# Patient Record
Sex: FEMALE | Race: WHITE | NOT HISPANIC OR LATINO | Employment: FULL TIME | URBAN - METROPOLITAN AREA
[De-identification: names, ages, dates, MRNs, and addresses within clinical notes are randomized per-mention and may not be internally consistent; named-entity substitution may affect disease eponyms.]

---

## 2017-01-13 ENCOUNTER — ALLSCRIPTS OFFICE VISIT (OUTPATIENT)
Dept: OTHER | Facility: OTHER | Age: 37
End: 2017-01-13

## 2017-01-17 RX ORDER — TURMERIC ROOT EXTRACT 500 MG
TABLET ORAL
COMMUNITY

## 2017-01-17 RX ORDER — LORAZEPAM 0.5 MG/1
TABLET ORAL
COMMUNITY
Start: 2013-12-02

## 2017-01-17 RX ORDER — ACETAMINOPHEN 500 MG
500 TABLET ORAL EVERY 6 HOURS PRN
COMMUNITY
End: 2022-04-21 | Stop reason: ALTCHOICE

## 2017-01-17 RX ORDER — IBUPROFEN 600 MG/1
TABLET ORAL
COMMUNITY
Start: 2012-08-22 | End: 2017-01-25 | Stop reason: HOSPADM

## 2017-01-17 RX ORDER — ZINC GLUCONATE 50 MG
TABLET ORAL
COMMUNITY

## 2017-01-19 ENCOUNTER — TRANSCRIBE ORDERS (OUTPATIENT)
Dept: ADMINISTRATIVE | Facility: HOSPITAL | Age: 37
End: 2017-01-19

## 2017-01-19 ENCOUNTER — APPOINTMENT (OUTPATIENT)
Dept: LAB | Facility: HOSPITAL | Age: 37
End: 2017-01-19
Attending: SURGERY
Payer: COMMERCIAL

## 2017-01-19 DIAGNOSIS — K80.00 CALCULUS OF GALLBLADDER WITH ACUTE CHOLECYSTITIS WITHOUT OBSTRUCTION: Primary | ICD-10-CM

## 2017-01-19 DIAGNOSIS — K80.00 CALCULUS OF GALLBLADDER WITH ACUTE CHOLECYSTITIS WITHOUT OBSTRUCTION: ICD-10-CM

## 2017-01-19 LAB
ALBUMIN SERPL BCP-MCNC: 3.9 G/DL (ref 3.5–5)
ALP SERPL-CCNC: 69 U/L (ref 46–116)
ALT SERPL W P-5'-P-CCNC: 28 U/L (ref 12–78)
ANION GAP SERPL CALCULATED.3IONS-SCNC: 7 MMOL/L (ref 4–13)
AST SERPL W P-5'-P-CCNC: 15 U/L (ref 5–45)
BILIRUB DIRECT SERPL-MCNC: 0.25 MG/DL (ref 0–0.2)
BILIRUB SERPL-MCNC: 1.3 MG/DL (ref 0.2–1)
BUN SERPL-MCNC: 14 MG/DL (ref 5–25)
CALCIUM SERPL-MCNC: 8.9 MG/DL (ref 8.3–10.1)
CHLORIDE SERPL-SCNC: 105 MMOL/L (ref 100–108)
CO2 SERPL-SCNC: 29 MMOL/L (ref 21–32)
CREAT SERPL-MCNC: 0.71 MG/DL (ref 0.6–1.3)
GFR SERPL CREATININE-BSD FRML MDRD: >60 ML/MIN/1.73SQ M
GLUCOSE SERPL-MCNC: 127 MG/DL (ref 65–140)
POTASSIUM SERPL-SCNC: 3.8 MMOL/L (ref 3.5–5.3)
PROT SERPL-MCNC: 7.5 G/DL (ref 6.4–8.2)
SODIUM SERPL-SCNC: 141 MMOL/L (ref 136–145)

## 2017-01-19 PROCEDURE — 80053 COMPREHEN METABOLIC PANEL: CPT

## 2017-01-19 PROCEDURE — 82248 BILIRUBIN DIRECT: CPT

## 2017-01-19 PROCEDURE — 36415 COLL VENOUS BLD VENIPUNCTURE: CPT

## 2017-01-24 ENCOUNTER — ANESTHESIA EVENT (OUTPATIENT)
Dept: PERIOP | Facility: HOSPITAL | Age: 37
End: 2017-01-24
Payer: COMMERCIAL

## 2017-01-25 ENCOUNTER — HOSPITAL ENCOUNTER (OUTPATIENT)
Facility: HOSPITAL | Age: 37
Setting detail: OUTPATIENT SURGERY
Discharge: HOME | End: 2017-01-25
Attending: SURGERY | Admitting: SURGERY
Payer: COMMERCIAL

## 2017-01-25 ENCOUNTER — APPOINTMENT (OUTPATIENT)
Dept: RADIOLOGY | Facility: HOSPITAL | Age: 37
End: 2017-01-25
Payer: COMMERCIAL

## 2017-01-25 ENCOUNTER — ANESTHESIA (OUTPATIENT)
Dept: PERIOP | Facility: HOSPITAL | Age: 37
End: 2017-01-25
Payer: COMMERCIAL

## 2017-01-25 VITALS
OXYGEN SATURATION: 100 % | WEIGHT: 130 LBS | SYSTOLIC BLOOD PRESSURE: 101 MMHG | TEMPERATURE: 98.1 F | HEART RATE: 78 BPM | BODY MASS INDEX: 19.7 KG/M2 | HEIGHT: 68 IN | RESPIRATION RATE: 20 BRPM | DIASTOLIC BLOOD PRESSURE: 55 MMHG

## 2017-01-25 DIAGNOSIS — K80.20 CALCULUS OF GALLBLADDER WITHOUT CHOLECYSTITIS WITHOUT OBSTRUCTION: ICD-10-CM

## 2017-01-25 LAB
ALBUMIN SERPL BCP-MCNC: 3.7 G/DL (ref 3.5–5)
ALP SERPL-CCNC: 60 U/L (ref 46–116)
ALT SERPL W P-5'-P-CCNC: 22 U/L (ref 12–78)
ANION GAP SERPL CALCULATED.3IONS-SCNC: 10 MMOL/L (ref 4–13)
AST SERPL W P-5'-P-CCNC: 15 U/L (ref 5–45)
BILIRUB DIRECT SERPL-MCNC: 0.26 MG/DL (ref 0–0.2)
BILIRUB SERPL-MCNC: 1.4 MG/DL (ref 0.2–1)
BUN SERPL-MCNC: 14 MG/DL (ref 5–25)
CALCIUM SERPL-MCNC: 8.5 MG/DL (ref 8.3–10.1)
CHLORIDE SERPL-SCNC: 108 MMOL/L (ref 100–108)
CO2 SERPL-SCNC: 27 MMOL/L (ref 21–32)
CREAT SERPL-MCNC: 0.79 MG/DL (ref 0.6–1.3)
EXT PREGNANCY TEST URINE: NEGATIVE
GFR SERPL CREATININE-BSD FRML MDRD: >60 ML/MIN/1.73SQ M
GLUCOSE SERPL-MCNC: 84 MG/DL (ref 65–140)
POTASSIUM SERPL-SCNC: 4 MMOL/L (ref 3.5–5.3)
PROT SERPL-MCNC: 7.2 G/DL (ref 6.4–8.2)
SODIUM SERPL-SCNC: 145 MMOL/L (ref 136–145)

## 2017-01-25 PROCEDURE — 81025 URINE PREGNANCY TEST: CPT | Performed by: PHYSICIAN ASSISTANT

## 2017-01-25 PROCEDURE — 74300 X-RAY BILE DUCTS/PANCREAS: CPT

## 2017-01-25 PROCEDURE — 82248 BILIRUBIN DIRECT: CPT | Performed by: PHYSICIAN ASSISTANT

## 2017-01-25 PROCEDURE — 88304 TISSUE EXAM BY PATHOLOGIST: CPT | Performed by: SURGERY

## 2017-01-25 PROCEDURE — 80053 COMPREHEN METABOLIC PANEL: CPT | Performed by: PHYSICIAN ASSISTANT

## 2017-01-25 RX ORDER — PROPOFOL 10 MG/ML
INJECTION, EMULSION INTRAVENOUS AS NEEDED
Status: DISCONTINUED | OUTPATIENT
Start: 2017-01-25 | End: 2017-01-25 | Stop reason: SURG

## 2017-01-25 RX ORDER — ONDANSETRON 2 MG/ML
INJECTION INTRAMUSCULAR; INTRAVENOUS AS NEEDED
Status: DISCONTINUED | OUTPATIENT
Start: 2017-01-25 | End: 2017-01-25 | Stop reason: SURG

## 2017-01-25 RX ORDER — FENTANYL CITRATE/PF 50 MCG/ML
25 SYRINGE (ML) INJECTION
Status: COMPLETED | OUTPATIENT
Start: 2017-01-25 | End: 2017-01-25

## 2017-01-25 RX ORDER — KETOROLAC TROMETHAMINE 30 MG/ML
INJECTION, SOLUTION INTRAMUSCULAR; INTRAVENOUS AS NEEDED
Status: DISCONTINUED | OUTPATIENT
Start: 2017-01-25 | End: 2017-01-25 | Stop reason: SURG

## 2017-01-25 RX ORDER — EPHEDRINE SULFATE 50 MG/ML
INJECTION, SOLUTION INTRAVENOUS AS NEEDED
Status: DISCONTINUED | OUTPATIENT
Start: 2017-01-25 | End: 2017-01-25 | Stop reason: SURG

## 2017-01-25 RX ORDER — HYDROCODONE BITARTRATE AND ACETAMINOPHEN 5; 325 MG/1; MG/1
2 TABLET ORAL EVERY 4 HOURS PRN
Status: DISCONTINUED | OUTPATIENT
Start: 2017-01-25 | End: 2017-01-25 | Stop reason: HOSPADM

## 2017-01-25 RX ORDER — SODIUM CHLORIDE, SODIUM LACTATE, POTASSIUM CHLORIDE, CALCIUM CHLORIDE 600; 310; 30; 20 MG/100ML; MG/100ML; MG/100ML; MG/100ML
125 INJECTION, SOLUTION INTRAVENOUS CONTINUOUS
Status: DISCONTINUED | OUTPATIENT
Start: 2017-01-25 | End: 2017-01-25 | Stop reason: HOSPADM

## 2017-01-25 RX ORDER — SCOLOPAMINE TRANSDERMAL SYSTEM 1 MG/1
1 PATCH, EXTENDED RELEASE TRANSDERMAL
Status: DISCONTINUED | OUTPATIENT
Start: 2017-01-25 | End: 2017-01-25 | Stop reason: HOSPADM

## 2017-01-25 RX ORDER — METOCLOPRAMIDE HYDROCHLORIDE 5 MG/ML
10 INJECTION INTRAMUSCULAR; INTRAVENOUS ONCE
Status: COMPLETED | OUTPATIENT
Start: 2017-01-25 | End: 2017-01-25

## 2017-01-25 RX ORDER — ROCURONIUM BROMIDE 10 MG/ML
INJECTION, SOLUTION INTRAVENOUS AS NEEDED
Status: DISCONTINUED | OUTPATIENT
Start: 2017-01-25 | End: 2017-01-25 | Stop reason: SURG

## 2017-01-25 RX ORDER — MAGNESIUM HYDROXIDE 1200 MG/15ML
LIQUID ORAL AS NEEDED
Status: DISCONTINUED | OUTPATIENT
Start: 2017-01-25 | End: 2017-01-25 | Stop reason: HOSPADM

## 2017-01-25 RX ORDER — HYDROCODONE BITARTRATE AND ACETAMINOPHEN 5; 325 MG/1; MG/1
1-2 TABLET ORAL EVERY 6 HOURS PRN
Qty: 25 TABLET | Refills: 0 | Status: SHIPPED | OUTPATIENT
Start: 2017-01-25 | End: 2017-02-04

## 2017-01-25 RX ORDER — FENTANYL CITRATE 50 UG/ML
INJECTION, SOLUTION INTRAMUSCULAR; INTRAVENOUS AS NEEDED
Status: DISCONTINUED | OUTPATIENT
Start: 2017-01-25 | End: 2017-01-25 | Stop reason: SURG

## 2017-01-25 RX ORDER — MIDAZOLAM HYDROCHLORIDE 1 MG/ML
INJECTION INTRAMUSCULAR; INTRAVENOUS AS NEEDED
Status: DISCONTINUED | OUTPATIENT
Start: 2017-01-25 | End: 2017-01-25 | Stop reason: SURG

## 2017-01-25 RX ORDER — ONDANSETRON 2 MG/ML
4 INJECTION INTRAMUSCULAR; INTRAVENOUS EVERY 6 HOURS PRN
Status: DISCONTINUED | OUTPATIENT
Start: 2017-01-25 | End: 2017-01-25 | Stop reason: HOSPADM

## 2017-01-25 RX ORDER — GLYCOPYRROLATE 0.2 MG/ML
INJECTION INTRAMUSCULAR; INTRAVENOUS AS NEEDED
Status: DISCONTINUED | OUTPATIENT
Start: 2017-01-25 | End: 2017-01-25 | Stop reason: SURG

## 2017-01-25 RX ADMIN — FENTANYL CITRATE 25 MCG: 50 INJECTION, SOLUTION INTRAMUSCULAR; INTRAVENOUS at 08:55

## 2017-01-25 RX ADMIN — FENTANYL CITRATE 25 MCG: 50 INJECTION, SOLUTION INTRAMUSCULAR; INTRAVENOUS at 09:25

## 2017-01-25 RX ADMIN — GLYCOPYRROLATE 0.1 MG: 0.2 INJECTION, SOLUTION INTRAMUSCULAR; INTRAVENOUS at 08:24

## 2017-01-25 RX ADMIN — HYDROMORPHONE HYDROCHLORIDE 0.25 MG: 1 INJECTION, SOLUTION INTRAMUSCULAR; INTRAVENOUS; SUBCUTANEOUS at 09:39

## 2017-01-25 RX ADMIN — SODIUM CHLORIDE, SODIUM LACTATE, POTASSIUM CHLORIDE, AND CALCIUM CHLORIDE: .6; .31; .03; .02 INJECTION, SOLUTION INTRAVENOUS at 07:28

## 2017-01-25 RX ADMIN — KETOROLAC TROMETHAMINE 15 MG: 30 INJECTION, SOLUTION INTRAMUSCULAR at 08:20

## 2017-01-25 RX ADMIN — FENTANYL CITRATE 25 MCG: 50 INJECTION, SOLUTION INTRAMUSCULAR; INTRAVENOUS at 07:55

## 2017-01-25 RX ADMIN — FENTANYL CITRATE 25 MCG: 50 INJECTION, SOLUTION INTRAMUSCULAR; INTRAVENOUS at 09:10

## 2017-01-25 RX ADMIN — ROCURONIUM BROMIDE 40 MG: 10 INJECTION, SOLUTION INTRAVENOUS at 07:45

## 2017-01-25 RX ADMIN — GLYCOPYRROLATE 0.1 MG: 0.2 INJECTION, SOLUTION INTRAMUSCULAR; INTRAVENOUS at 08:25

## 2017-01-25 RX ADMIN — SCOPALAMINE 1 PATCH: 1 PATCH, EXTENDED RELEASE TRANSDERMAL at 11:16

## 2017-01-25 RX ADMIN — FENTANYL CITRATE 25 MCG: 50 INJECTION, SOLUTION INTRAMUSCULAR; INTRAVENOUS at 09:00

## 2017-01-25 RX ADMIN — PROPOFOL 150 MG: 10 INJECTION, EMULSION INTRAVENOUS at 07:45

## 2017-01-25 RX ADMIN — METRONIDAZOLE 500 MG: 500 INJECTION, SOLUTION INTRAVENOUS at 07:50

## 2017-01-25 RX ADMIN — HYDROMORPHONE HYDROCHLORIDE 0.25 MG: 1 INJECTION, SOLUTION INTRAMUSCULAR; INTRAVENOUS; SUBCUTANEOUS at 09:56

## 2017-01-25 RX ADMIN — NEOSTIGMINE METHYLSULFATE 3 MG: 1 INJECTION INTRAMUSCULAR; INTRAVENOUS; SUBCUTANEOUS at 08:24

## 2017-01-25 RX ADMIN — FENTANYL CITRATE 50 MCG: 50 INJECTION, SOLUTION INTRAMUSCULAR; INTRAVENOUS at 07:43

## 2017-01-25 RX ADMIN — FENTANYL CITRATE 25 MCG: 50 INJECTION, SOLUTION INTRAMUSCULAR; INTRAVENOUS at 08:02

## 2017-01-25 RX ADMIN — SODIUM CHLORIDE, SODIUM LACTATE, POTASSIUM CHLORIDE, AND CALCIUM CHLORIDE 125 ML/HR: .6; .31; .03; .02 INJECTION, SOLUTION INTRAVENOUS at 09:59

## 2017-01-25 RX ADMIN — HYDROCODONE BITARTRATE AND ACETAMINOPHEN 1 TABLET: 5; 325 TABLET ORAL at 12:55

## 2017-01-25 RX ADMIN — GLYCOPYRROLATE 0.1 MG: 0.2 INJECTION, SOLUTION INTRAMUSCULAR; INTRAVENOUS at 08:31

## 2017-01-25 RX ADMIN — MIDAZOLAM HYDROCHLORIDE 2 MG: 1 INJECTION, SOLUTION INTRAMUSCULAR; INTRAVENOUS at 07:34

## 2017-01-25 RX ADMIN — ONDANSETRON 4 MG: 2 INJECTION INTRAMUSCULAR; INTRAVENOUS at 08:20

## 2017-01-25 RX ADMIN — EPHEDRINE SULFATE 5 MG: 50 INJECTION, SOLUTION INTRAMUSCULAR; INTRAVENOUS; SUBCUTANEOUS at 07:43

## 2017-01-25 RX ADMIN — METOCLOPRAMIDE 10 MG: 5 INJECTION, SOLUTION INTRAMUSCULAR; INTRAVENOUS at 11:02

## 2017-01-25 RX ADMIN — ONDANSETRON 4 MG: 2 INJECTION INTRAMUSCULAR; INTRAVENOUS at 10:10

## 2017-01-25 RX ADMIN — CEFAZOLIN SODIUM 2000 MG: 2 SOLUTION INTRAVENOUS at 07:28

## 2017-02-10 ENCOUNTER — ALLSCRIPTS OFFICE VISIT (OUTPATIENT)
Dept: OTHER | Facility: OTHER | Age: 37
End: 2017-02-10

## 2017-03-28 ENCOUNTER — GENERIC CONVERSION - ENCOUNTER (OUTPATIENT)
Dept: OTHER | Facility: OTHER | Age: 37
End: 2017-03-28

## 2017-06-30 ENCOUNTER — ALLSCRIPTS OFFICE VISIT (OUTPATIENT)
Dept: OTHER | Facility: OTHER | Age: 37
End: 2017-06-30

## 2017-10-26 ENCOUNTER — ALLSCRIPTS OFFICE VISIT (OUTPATIENT)
Dept: OTHER | Facility: OTHER | Age: 37
End: 2017-10-26

## 2017-10-27 NOTE — PROGRESS NOTES
Assessment  1  Tracheobronchitis (490) (J40)   2  Never a smoker   3  Fatigue (780 79) (R53 83)    Plan  Tracheobronchitis    · Azithromycin 250 MG Oral Tablet; TAKE 2 TABLETS ON DAY 1 THEN TAKE 1  TABLET A DAY FOR 4 DAYS   · Benzonatate 100 MG Oral Capsule; TAKE 1 CAPSULE 3 TIMES DAILY AS  NEEDED   · ProAir  (90 Base) MCG/ACT Inhalation Aerosol Solution; INHALE 2  PUFFS EVERY 4 HOURS AS NEEDED    Discussion/Summary    1) fluids  2) azithromycin 2 tabs today then 1 tab daily for 4 daysbenzonate 1 tab 3 times a day as needed for coughproair 2 puffs every 4 hours as needed for chest tightness, coughcall if not improving  Possible side effects of new medications were reviewed with the patient/guardian today  The treatment plan was reviewed with the patient/guardian  The patient/guardian understands and agrees with the treatment plan      Chief Complaint  Pt here with a painful cough that feels it is getting bronchial since last Tuesday and a runny nose this morning  History of Present Illness  HPI: had gallbladder out in june  cannot eat eggs or fatty foods, has made significant dietary adjustments  is here for a cough for over a week  She states that it is deep in her chest  She occasionally gets some production of phlegm  She has some shortness of breath  She is having trouble sleeping at night  Review of Systems    Constitutional: recent 5 lb weight gain-- and-- feeling tired, but-- as noted in HPI    ENT: earache,-- sore throat-- and-- nasal discharge, but-- as noted in HPI,-- no nosebleeds,-- no hearing loss-- and-- no hoarseness  Cardiovascular: no complaints of slow or fast heart rate, no chest pain, no palpitations, no leg claudication or lower extremity edema  Respiratory: shortness of breath,-- cough,-- wheezing-- and-- PND, but-- as noted in HPI,-- no orthopnea-- and-- no shortness of breath during exertion  Breasts: no complaints of breast pain, breast lump or nipple discharge  Gastrointestinal: no complaints of abdominal pain, no constipation, no nausea or diarrhea, no vomiting, no bloody stools  Genitourinary: no complaints of dysuria, no incontinence, no pelvic pain, no dysmenorrhea, no vaginal discharge or abnormal vaginal bleeding  Integumentary: no complaints of skin rash or lesion, no itching or dry skin, no skin wounds  Neurological: no complaints of headache, no confusion, no numbness or tingling, no dizziness or fainting  Active Problems  1  Anemia (285 9) (D64 9)   2  Anxiety disorder (300 00) (F41 9)   3  Arthralgia (719 40) (M25 50)   4  Depression (311) (F32 9)   5  Elevated ALT measurement (790 4) (R74 0)   6  External hemorrhoid (455 3) (K64 4)   7  Fatigue (780 79) (R53 83)   8  Hashimoto's thyroiditis (245 2) (E06 3)   9  Hyperbilirubinemia (782 4) (E80 6)   10  Hypothyroidism (244 9) (E03 9)   11  Metrorrhagia (626 6) (N92 1)   12  Rotator cuff tendinitis, left (726 10) (M75 82)   13  Tendinitis, de Quervain's (727 04) (M65 4)    Past Medical History  1  History of Acute apical periodontitis of pulpal origin (522 4) (K04 4)   2  History of Chronic cholecystitis with calculus (574 10) (K80 10)   3  History of Disorder of coccyx (724 70) (M53 3)   4  History of Elderly multigravida in second trimester (659 63) (O09 522)   5  History of Gestational diabetes mellitus, class A1 (648 80) (O24 410)   6  History of spontaneous  (V13 29) (Z87 59)   7  History of Migraine headache (346 90) (G43 909)   8  History of Umbilical hernia without obstruction and without gangrene (553 1) (K42 9)  Active Problems And Past Medical History Reviewed: The active problems and past medical history were reviewed and updated today  Family History  Mother    1  No pertinent family history  Paternal Grandmother    2  Family history of Breast Cancer (V16 3)   3  Family history of Skin Cancer (V16 8)  Paternal Grandfather    4   Family history of Colon Cancer (V16 0)  Family History Reviewed: The family history was reviewed and updated today  Social History   · Being A Social Drinker   · Never a smoker  The social history was reviewed and updated today  The social history was reviewed and is unchanged  Surgical History  1  History of Cervical Conization   2  History of Cholecystectomy Laparoscopic   3  History of Umbilical Hernia Repair - Over Age 5  Surgical History Reviewed: The surgical history was reviewed and updated today  Current Meds   1  Hydrocortisone 2 5 % External Cream; apply a small amt of cream to the outer rectal   area 1-2 times a day as needed for itching; Therapy: 84OXV2741 to (Last Rx:30Jun2017)  Requested for: 30Jun2017 Ordered   2  Ibuprofen 600 MG Oral Tablet; TAKE 1 TABLET 3 times daily; Therapy: 33Oyy1556 to (Evaluate:09Jun2017)  Requested for: 98Imr4747; Last   Rx:38Zes3065 Ordered    The medication list was reviewed and updated today  Allergies  1  Sulfa Drugs  2  No Known Environmental Allergies   3  No Known Food Allergies    Vitals   Recorded: 99VZE4764 10:04AM   Temperature 97 3 F   Heart Rate 85   Systolic 383   Diastolic 82   Height 5 ft 7 in   Weight 124 lb    BMI Calculated 19 42   BSA Calculated 1 65   O2 Saturation 97     Physical Exam    Constitutional   General appearance: No acute distress, well appearing and well nourished  Eyes   Conjunctiva and lids: No swelling, erythema or discharge  Pupils and irises: Equal, round and reactive to light  Ears, Nose, Mouth, and Throat   External inspection of ears and nose: Normal     Otoscopic examination: Abnormal  -- Dull TMs bilaterally without erythema  Nasal mucosa, septum, and turbinates: Normal without edema or erythema  Oropharynx: Normal with no erythema, edema, exudate or lesions  Pulmonary   Respiratory effort: No increased work of breathing or signs of respiratory distress      Auscultation of lungs: Abnormal  -- Wheeze in left lung lower lobe, no rales  Cardiovascular   Auscultation of heart: Normal rate and rhythm, normal S1 and S2, without murmurs  Abdomen   Abdomen: Non-tender, no masses  Liver and spleen: No hepatomegaly or splenomegaly      Musculoskeletal   Gait and station: Normal     Psychiatric   Orientation to person, place, and time: Normal     Mood and affect: Normal          Signatures   Electronically signed by : Cornelio Romero DO; Oct 26 2017 10:30PM EST                       (Author)

## 2017-12-11 ENCOUNTER — TRANSCRIBE ORDERS (OUTPATIENT)
Dept: ADMINISTRATIVE | Facility: HOSPITAL | Age: 37
End: 2017-12-11

## 2017-12-11 DIAGNOSIS — Z12.31 ENCOUNTER FOR SCREENING MAMMOGRAM FOR MALIGNANT NEOPLASM OF BREAST: Primary | ICD-10-CM

## 2017-12-21 ENCOUNTER — GENERIC CONVERSION - ENCOUNTER (OUTPATIENT)
Dept: OTHER | Facility: OTHER | Age: 37
End: 2017-12-21

## 2017-12-21 ENCOUNTER — HOSPITAL ENCOUNTER (OUTPATIENT)
Dept: BONE DENSITY | Facility: IMAGING CENTER | Age: 37
Discharge: HOME | End: 2017-12-21
Payer: COMMERCIAL

## 2017-12-21 DIAGNOSIS — Z12.31 ENCOUNTER FOR SCREENING MAMMOGRAM FOR MALIGNANT NEOPLASM OF BREAST: ICD-10-CM

## 2017-12-21 PROCEDURE — G0202 SCR MAMMO BI INCL CAD: HCPCS

## 2018-01-11 NOTE — RESULT NOTES
Verified Results  (1) HEPATIC FUNCTION PANEL 27Mar2017 10:02AM Dawson Rader     Test Name Result Flag Reference   Protein, Total, Serum 7 4 g/dL  6 0-8 5   Albumin, Serum 4 7 g/dL  3 5-5 5   Bilirubin, Total 1 6 mg/dL H 0 0-1 2   Bilirubin, Direct 0 37 mg/dL  0 00-0 40   Alkaline Phosphatase, S 72 IU/L     AST (SGOT) 17 IU/L  0-40   ALT (SGPT) 15 IU/L  0-32

## 2018-01-12 VITALS
OXYGEN SATURATION: 98 % | HEART RATE: 83 BPM | SYSTOLIC BLOOD PRESSURE: 110 MMHG | BODY MASS INDEX: 18.68 KG/M2 | WEIGHT: 119 LBS | HEIGHT: 67 IN | TEMPERATURE: 97.8 F | DIASTOLIC BLOOD PRESSURE: 70 MMHG

## 2018-01-12 NOTE — RESULT NOTES
Verified Results  US ABDOMEN LIMITED 63Vhz3126 04:38PM Darryl Mullins Order Number: [id number]    - Patient Instructions: To schedule this appointment, please contact Central Scheduling at [phone]  Test Name Result Flag Reference   US ABDOMEN LIMITED (Report)     RIGHT UPPER QUADRANT ULTRASOUND     INDICATION: Abdominal pain  Elevated bilirubin, LFTs  COMPARISON: None  TECHNIQUE:  Real-time ultrasound of the right upper quadrant was performed with a curvilinear transducer with both volumetric sweeps and still imaging techniques  FINDINGS:     PANCREAS: Visualized portions of the pancreas are within normal limits  AORTA AND IVC: Visualized portions are normal for patient age  LIVER:   Size: Within normal range  The liver measures 16 cm in the midclavicular line  Contour: Surface contour is smooth  Parenchyma: Echogenicity and echotexture are within normal limits  No evidence of suspicious mass  Limited imaging of the main portal vein shows it to be patent and hepatopedal       BILIARY:   The gallbladder is slightly contracted, which limits evaluation  Top normal wall thickness of 4 mm  No pericholecystic fluid  Multiple mobile dependent calculi  No sludge  No sonographic Esquviel's sign  No intrahepatic biliary dilatation  CBD measures 3 mm  No choledocholithiasis  KIDNEY:    Right kidney measures 11 7 cm  Within normal limits  ASCITES:  None  IMPRESSION:     Cholelithiasis, with no evidence of acute cholecystitis  Normal caliber common bile duct         Workstation performed: [id number]     Signed by:   Juan J Rodriguez MD   12/13/16

## 2018-01-13 VITALS
OXYGEN SATURATION: 97 % | HEART RATE: 85 BPM | HEIGHT: 67 IN | WEIGHT: 124 LBS | SYSTOLIC BLOOD PRESSURE: 110 MMHG | DIASTOLIC BLOOD PRESSURE: 82 MMHG | BODY MASS INDEX: 19.46 KG/M2 | TEMPERATURE: 97.3 F

## 2018-01-13 VITALS
HEART RATE: 80 BPM | SYSTOLIC BLOOD PRESSURE: 110 MMHG | RESPIRATION RATE: 16 BRPM | HEIGHT: 67 IN | TEMPERATURE: 96.6 F | BODY MASS INDEX: 20.1 KG/M2 | WEIGHT: 128.05 LBS | DIASTOLIC BLOOD PRESSURE: 64 MMHG

## 2018-01-14 VITALS
DIASTOLIC BLOOD PRESSURE: 64 MMHG | HEIGHT: 67 IN | TEMPERATURE: 97.9 F | WEIGHT: 130.03 LBS | SYSTOLIC BLOOD PRESSURE: 112 MMHG | RESPIRATION RATE: 16 BRPM | BODY MASS INDEX: 20.41 KG/M2 | HEART RATE: 84 BPM

## 2018-01-15 NOTE — PROGRESS NOTES
Assessment    1  Encounter for preventive health examination (V70 0) (Z00 00)   2  Never a smoker   3  Anemia (285 9) (D64 9)   4  Fatigue (780 79) (R53 83)    Plan   Encounter for screening for diabetes mellitus    · (1) COMPREHENSIVE METABOLIC PANEL; Status:Active; Requested for:81Rdk6522;   Encounter for screening for diabetes mellitus, Hypothyroidism    · (1) LIPID PANEL FASTING W DIRECT LDL REFLEX; Status:Active; Requested  for:31Tlz5916;    · (1) TSH; Status:Active; Requested for:02Rlo9066;   Fatigue    · (1) CBC/PLT/DIFF; Status:Active; Requested for:24Hot7471;    · (1) GIGI BARR VIRUS; Status:Active; Requested for:30Trw0211; Health Maintenance    · Always use a seat belt and shoulder strap when riding or driving a motor vehicle ;  Status:Complete;   Done: 75PWM8032   · Brush your teeth 3 times a day and floss at least once a day ; Status:Complete;   Done:  86AKP2517   · Decreasing the stress in your life may help your condition improve ; Status:Complete;    Done: 92JZZ2467   · Eat foods that are high in calcium ; Status:Complete;   Done: 55CMH2816   · Regular aerobic exercise can help reduce stress ; Status:Complete;   Done: 26RZC0732   · We encourage all of our patients to exercise regularly  30 minutes of exercise or physical  activity five or more days a week is recommended for children and adults ;  Status:Complete;   Done: 94ASK1571   · We recommend routine visits to a dentist ; Status:Complete;   Done: 99JQB2814   · Call [phone] if: You have any warning signs of skin cancer ; Status:Complete;    Done: 07QWJ6546    (LC) Thyroid Peroxidase (TPO) Ab; Status:Resulted - Requires Verification;   Done: 87IZI2947 12:00AM  NZR:01LJV5039;XICHWPY;    For:Hypothyroidism; Ordered By:Nicolle Saldana;   (1) IRON; Status:Resulted - Requires Verification;   Done: 69VJH7310 12:00AM  BKC:31OIR8447;ROTAKJB; Jeremie Miss;  Ordered By:Marita Saldanas;      Discussion/Summary  health maintenance visit healthy adult female Currently, she eats a healthy diet and has an inadequate exercise regimen  cervical cancer screening is current Breast cancer screening: breast cancer screening is not indicated  Colorectal cancer screening: colorectal cancer screening is not indicated  Osteoporosis screening: bone mineral density testing is not indicated  The immunizations are up to date  Patient discussion: discussed with the patient  1) blood work at [employer]  2) rest, activity small amounts at a time  Chief Complaint  PATIENT IS HERE FOR A WORK PHYSICAL  History of Present Illness  HM, Adult Female: The patient is being seen for a health maintenance evaluation  General Health: The patient's health since the last visit is described as good  She has regular dental visits  She denies vision problems  She denies hearing loss  Lifestyle:  She consumes a diverse and healthy diet  She does not have any weight concerns  She does not exercise regularly  She does not use tobacco  She consumes alcohol  She denies drug use  Reproductive health: the patient is premenopausal    Screening: cancer screening reviewed and updated  metabolic screening reviewed and updated  risk screening reviewed and updated  HPI: pt here for work physical  she [place]  states has been unusually fatigued  Review of Systems    Constitutional: feeling tired and recent 23 lb weight loss, but as noted in HPI  Eyes: No complaints of eye pain, no red eyes, no eyesight problems, no discharge, no dry eyes, no itching of eyes  ENT: no complaints of earache, no loss of hearing, no nose bleeds, no nasal discharge, no sore throat, no hoarseness  Cardiovascular: No complaints of slow heart rate, no fast heart rate, no chest pain, no palpitations, no leg claudication, no lower extremity edema     Respiratory: No complaints of shortness of breath, no wheezing, no cough, no SOB on exertion, no orthopnea, no PND  Gastrointestinal: No complaints of abdominal pain, no constipation, no nausea or vomiting, no diarrhea, no bloody stools  Genitourinary: No complaints of dysuria, no incontinence, no pelvic pain, no dysmenorrhea, no vaginal discharge or bleeding  Musculoskeletal: No complaints of arthralgias, no myalgias, no joint swelling or stiffness, no limb pain or swelling  Integumentary: No complaints of skin rash or lesions, no itching, no skin wounds, no breast pain or lump  Neurological: No complaints of headache, no confusion, no convulsions, no numbness, no dizziness or fainting, no tingling, no limb weakness, no difficulty walking  Active Problems    1  Abnormal blood chemistry (790 6) (R79 9)   2  Anxiety disorder (300 00) (F41 9)   3  Arthralgia (719 40) (M25 50)   4  Depression (311) (F32 9)   5  Elderly multigravida in second trimester (659 63) (O09 522)   6  Encounter for screening for diabetes mellitus (V77 1) (Z13 1)   7  Fatigue (780 79) (R53 83)   8  Gestational diabetes mellitus, class A1 (648 80) (O24 410)   9  Hypothyroidism (244 9) (E03 9)   10  Metrorrhagia (626 6) (N92 1)   11  Migraine headache (346 90) (G43 909)   12  Miscarriage (634 90) (O03 9)   13   Need for diphtheria-tetanus-pertussis (Tdap) vaccine, adult/adolescent (V06 1) (Z23)    Past Medical History    · History of Acute apical periodontitis of pulpal origin (522 4) (K04 4)   · History of Disorder of coccyx (724 70) (M53 3)   · History of Hair loss (704 00) (L65 9)   · History of Hip pain, unspecified laterality   · History of diarrhea (V12 79) (A28 058)   · History of infectious diarrhea (V12 79) (Z87 19)   · History of lymphadenopathy (V13 89) (S47 536)   · History of oral aphthous ulcers (V12 79) (Z87 19)   · History of shortness of breath (V13 89) (O33 975)   · History of Other ovarian dysfunction (256 8) (E28 8)   · History of Sore throat (462) (J02 9)    Surgical History    · History of Cervical Conization    Family History  Mother    · No pertinent family history  Paternal Grandmother    · Family history of Breast Cancer (V16 3)   · Family history of Skin Cancer (V16 8)  Paternal Grandfather    · Family history of Colon Cancer (V16 0)    Social History    · Being A Social Drinker   · Never a smoker    Current Meds   1  Ibuprofen 600 MG Oral Tablet; TAKE 1 TABLET 3 times daily; Therapy: 78Zes9690 to (Evaluate:35Ets4370)  Requested for: 98QQV7171; Last   Rx:18Nov2016 Ordered   2  LORazepam 0 5 MG Oral Tablet; Take one tablet as needed daily; Therapy: 02MPU5063 to (Last Rx:21Nov2014) Ordered   3  Prenatal Vitamin TABS; TAKE 1 TABLET DAILY; Therapy: (Recorded:19Nov2015) to Recorded   4  Synthroid TABS; TAKE 12 5 TABLET Daily per pt; Therapy: (Recorded:19Nov2015) to Recorded    Allergies    1  Sulfa Drugs    2  No Known Environmental Allergies   3  No Known Food Allergies    Vitals   Recorded: 93MLJ9798 05:53PM   Temperature 97 7 F   Heart Rate 96   Systolic 613   Diastolic 62   Height 5 ft 7 2 in   Weight 136 lb 12 8 oz   BMI Calculated 21 3   BSA Calculated 1 72     Physical Exam    Constitutional   General appearance: No acute distress, well appearing and well nourished  Head and Face   Head and face: Normal     Palpation of the face and sinuses: No sinus tenderness  Eyes   Conjunctiva and lids: No swelling, erythema or discharge  Ears, Nose, Mouth, and Throat   External inspection of ears and nose: Normal     Otoscopic examination: Tympanic membranes translucent with normal light reflex  Canals patent without erythema  Hearing: Normal     Nasal mucosa, septum, and turbinates: Normal without edema or erythema  Lips, teeth, and gums: Normal, good dentition  Neck   Neck: Supple, symmetric, trachea midline, no masses  Thyroid: Normal, no thyromegaly  Pulmonary   Respiratory effort: No increased work of breathing or signs of respiratory distress      Cardiovascular   Auscultation of heart: Normal rate and rhythm, normal S1 and S2, no murmurs  Examination of extremities for edema and/or varicosities: Normal     Abdomen   Abdomen: Non-tender, no masses  Liver and spleen: No hepatomegaly or splenomegaly  Lymphatic   Palpation of lymph nodes in neck: No lymphadenopathy  Musculoskeletal   Gait and station: Normal     Digits and nails: Normal without clubbing or cyanosis  Joints, bones, and muscles: Normal     Range of motion: Normal     Stability: Normal     Muscle strength/tone: Normal     Skin   Skin and subcutaneous tissue: Normal without rashes or lesions  Palpation of skin and subcutaneous tissue: Normal turgor  Neurologic   Cortical function: Normal mental status  Sensation: No sensory loss  Coordination: Normal finger to nose and heel to shin  Psychiatric   Judgment and insight: Normal     Orientation to person, place, and time: Normal     Recent and remote memory: Intact      Mood and affect: Normal        Signatures   Electronically signed by : Jeanelle Lefort, DO; Dec 12 2016 12:54AM EST                       (Author)

## 2018-01-18 NOTE — RESULT NOTES
Verified Results  (LC) Thyroid Peroxidase (TPO) Ab 00PRG3071 08:41AM Terrance Tillman     Test Name Result Flag Reference   Thyroid Peroxidase (TPO) Ab 163 IU/mL H 0-34     (1) CBC/PLT/DIFF 67OMD3461 08:40AM Terrance Tillman     Test Name Result Flag Reference   WBC 5 1 x10E3/uL  3 4-10 8   RBC 4 85 x10E6/uL  3 77-5 28   Hemoglobin 14 1 g/dL  11 1-15 9   Hematocrit 42 8 %  34 0-46  6   MCV 88 fL  79-97   MCH 29 1 pg  26 6-33 0   MCHC 32 9 g/dL  31 5-35 7   RDW 13 3 %  12 3-15 4   Platelets 823 T17D9/RQ  150-379   Neutrophils 56 %     Lymphs 32 %     Monocytes 7 %     Eos 4 %     Basos 1 %     Neutrophils (Absolute) 2 8 x10E3/uL  1 4-7 0   Lymphs (Absolute) 1 7 x10E3/uL  0 7-3 1   Monocytes(Absolute) 0 4 x10E3/uL  0 1-0 9   Eos (Absolute) 0 2 x10E3/uL  0 0-0 4   Baso (Absolute) 0 1 x10E3/uL  0 0-0 2   Immature Granulocytes 0 %     Immature Grans (Abs) 0 0 x10E3/uL  0 0-0 1     (1) LIPID PANEL FASTING W DIRECT LDL REFLEX 56XYC5882 08:40AM Terrance Tillman     Test Name Result Flag Reference   Cholesterol, Total 146 mg/dL  100-199   Triglycerides 70 mg/dL  0-149   HDL Cholesterol 78 mg/dL  >39   LDL Cholesterol Calc 54 mg/dL  0-99     (1) COMPREHENSIVE METABOLIC PANEL 71GCA6497 93:46PW Terrance Tillman     Test Name Result Flag Reference   Glucose, Serum 93 mg/dL  65-99   BUN 11 mg/dL  6-20   Creatinine, Serum 0 84 mg/dL  0 57-1 00   eGFR If NonAfricn Am 88 mL/min/1 73  >59   eGFR If Africn Am 102 mL/min/1 73  >59   BUN/Creatinine Ratio 13  8-20   Sodium, Serum 142 mmol/L  136-144   **Effective December 12, 2016 the reference interval**                   for Sodium, Serum will be changing to:                                                             134 - 144   Potassium, Serum 3 8 mmol/L  3 5-5 2   Chloride, Serum 101 mmol/L     **Effective December 12, 2016 the reference interval**                   for Chloride, Serum will be changing to:                                                              96 - 106   Carbon Dioxide, Total 25 mmol/L  18-29   Calcium, Serum 9 5 mg/dL  8 7-10 2   Protein, Total, Serum 7 0 g/dL  6 0-8 5   Albumin, Serum 4 4 g/dL  3 5-5 5   Globulin, Total 2 6 g/dL  1 5-4 5   A/G Ratio 1 7  1 1-2 5   Bilirubin, Total 2 2 mg/dL H 0 0-1 2   Alkaline Phosphatase, S 70 IU/L     AST (SGOT) 27 IU/L  0-40   ALT (SGPT) 54 IU/L H 0-32     (1) TSH [id number] 08:40AM Romario Sheller     Test Name Result Flag Reference   TSH 1 790 uIU/mL  0 450-4 500     (1) IRON 25UOI9580 08:39AM Romario Sheller     Test Name Result Flag Reference   Iron, Serum 96 ug/dL

## 2018-04-30 DIAGNOSIS — M25.50 ARTHRALGIA, UNSPECIFIED JOINT: Primary | ICD-10-CM

## 2018-04-30 RX ORDER — IBUPROFEN 600 MG/1
1 TABLET ORAL 3 TIMES DAILY
COMMUNITY
Start: 2012-08-22 | End: 2018-04-30 | Stop reason: SDUPTHER

## 2018-05-01 RX ORDER — IBUPROFEN 600 MG/1
600 TABLET ORAL 3 TIMES DAILY
Qty: 90 TABLET | Refills: 0 | Status: SHIPPED | OUTPATIENT
Start: 2018-05-01 | End: 2020-05-08 | Stop reason: ALTCHOICE

## 2018-06-28 ENCOUNTER — OFFICE VISIT (OUTPATIENT)
Dept: FAMILY MEDICINE CLINIC | Facility: CLINIC | Age: 38
End: 2018-06-28
Payer: COMMERCIAL

## 2018-06-28 VITALS
HEIGHT: 68 IN | HEART RATE: 76 BPM | OXYGEN SATURATION: 98 % | TEMPERATURE: 98 F | DIASTOLIC BLOOD PRESSURE: 60 MMHG | SYSTOLIC BLOOD PRESSURE: 115 MMHG | WEIGHT: 119.75 LBS | BODY MASS INDEX: 18.15 KG/M2

## 2018-06-28 DIAGNOSIS — E03.9 ACQUIRED HYPOTHYROIDISM: ICD-10-CM

## 2018-06-28 DIAGNOSIS — R53.82 CHRONIC FATIGUE: ICD-10-CM

## 2018-06-28 DIAGNOSIS — R74.01 ELEVATED ALT MEASUREMENT: ICD-10-CM

## 2018-06-28 DIAGNOSIS — D64.9 ANEMIA, UNSPECIFIED TYPE: ICD-10-CM

## 2018-06-28 DIAGNOSIS — E06.3 HASHIMOTO'S THYROIDITIS: ICD-10-CM

## 2018-06-28 DIAGNOSIS — R10.9 LEFT LATERAL ABDOMINAL PAIN: Primary | ICD-10-CM

## 2018-06-28 PROBLEM — M65.4 TENDINITIS, DE QUERVAIN'S: Status: ACTIVE | Noted: 2017-06-30

## 2018-06-28 PROBLEM — M75.82 ROTATOR CUFF TENDINITIS, LEFT: Status: ACTIVE | Noted: 2017-06-30

## 2018-06-28 PROBLEM — K64.4 EXTERNAL HEMORRHOID: Status: ACTIVE | Noted: 2017-06-30

## 2018-06-28 PROCEDURE — 99214 OFFICE O/P EST MOD 30 MIN: CPT | Performed by: FAMILY MEDICINE

## 2018-06-28 RX ORDER — HYDROCORTISONE 25 MG/ML
LOTION TOPICAL
COMMUNITY
Start: 2017-06-30 | End: 2021-10-25 | Stop reason: ALTCHOICE

## 2018-06-28 RX ORDER — AZITHROMYCIN 250 MG/1
TABLET, FILM COATED ORAL DAILY
Status: ON HOLD | COMMUNITY
Start: 2017-10-26 | End: 2018-09-25 | Stop reason: ALTCHOICE

## 2018-06-28 RX ORDER — HYOSCYAMINE SULFATE 0.125 MG
0.12 TABLET ORAL EVERY 4 HOURS PRN
Qty: 30 TABLET | Refills: 0 | Status: SHIPPED | OUTPATIENT
Start: 2018-06-28 | End: 2019-02-01 | Stop reason: ALTCHOICE

## 2018-06-28 RX ORDER — BENZONATATE 100 MG/1
1 CAPSULE ORAL 3 TIMES DAILY PRN
Status: ON HOLD | COMMUNITY
Start: 2017-10-26 | End: 2018-09-25 | Stop reason: ALTCHOICE

## 2018-06-28 RX ORDER — ALBUTEROL SULFATE 90 UG/1
2 AEROSOL, METERED RESPIRATORY (INHALATION) EVERY 4 HOURS PRN
Status: ON HOLD | COMMUNITY
Start: 2017-10-26 | End: 2018-09-25

## 2018-06-28 NOTE — PATIENT INSTRUCTIONS
Schedule ct scan abdomen/pelvis with contrast  Blood count, cmp, tsh, t4, t3, tpo   levsin 1 tab every 4 hours as needed for abdominal pain

## 2018-06-28 NOTE — PROGRESS NOTES
Assessment/Plan:      Diagnoses and all orders for this visit:    Left lateral abdominal pain  -     hyoscyamine (ANASPAZ,LEVSIN) 0 125 MG tablet; Take 1 tablet (0 125 mg total) by mouth every 4 (four) hours as needed for cramping  -     CBC and differential; Future  -     Comprehensive metabolic panel; Future  -     CBC and differential  -     Comprehensive metabolic panel  -     CT abdomen pelvis w contrast; Future    Acquired hypothyroidism  -     T4, free; Future  -     T3, free; Future  -     TSH, 3rd generation; Future  -     Anti-microsomal antibody; Future  -     T4, free  -     T3, free  -     TSH, 3rd generation  -     Anti-microsomal antibody    Hashimoto's thyroiditis  -     T4, free; Future  -     T3, free; Future  -     TSH, 3rd generation; Future  -     Anti-microsomal antibody; Future  -     T4, free  -     T3, free  -     TSH, 3rd generation  -     Anti-microsomal antibody    Chronic fatigue  -     CBC and differential; Future  -     EBV Ab VCA, IgG; Future  -     CBC and differential  -     EBV Ab VCA, IgG    Elevated ALT measurement  -     Comprehensive metabolic panel; Future  -     Comprehensive metabolic panel    Anemia, unspecified type  -     CBC and differential; Future  -     CBC and differential    Other orders  -     azithromycin (ZITHROMAX) 250 mg tablet; Take by mouth daily  -     benzonatate (TESSALON PERLES) 100 mg capsule; Take 1 capsule by mouth 3 (three) times a day as needed  -     hydrocortisone 2 5 % lotion; Apply topically  -     albuterol (PROAIR HFA) 90 mcg/act inhaler; Inhale 2 puffs every 4 (four) hours as needed        Left lateral abdominal pain:  Schedule CT scan abdomen pelvis rule out colitis, doubt diverticulitis  Hashimoto's thyroiditis:  Patient will repeat blood work  Subjective:     Patient ID: Mykel Akers is a 34 y o  female  Complains of left sided abdominal, sharp pain , just under rib cage  Woke up Tuesday am   Symptoms Over 1 week    Some constipation  Probiotics and juicing  No fever, no nausea  Review of Systems   Constitutional: Negative  Negative for fatigue and fever  HENT: Negative  Eyes: Negative  Respiratory: Negative  Negative for cough  Cardiovascular: Negative  Gastrointestinal: Positive for abdominal pain and constipation  Negative for vomiting  Endocrine: Negative  Genitourinary: Negative  Musculoskeletal: Negative  Skin: Negative  Allergic/Immunologic: Negative  Neurological: Negative  Psychiatric/Behavioral: Negative  The following portions of the patient's history were reviewed and updated as appropriate: allergies, current medications, past family history, past medical history, past social history, past surgical history and problem list     Objective:  Vitals:    06/28/18 0957   BP: 115/60   Pulse: 76   Temp: 98 °F (36 7 °C)   SpO2: 98%   Weight: 54 3 kg (119 lb 12 oz)   Height: 5' 7 5" (1 715 m)      Physical Exam   Constitutional: She is oriented to person, place, and time  She appears well-developed and well-nourished  HENT:   Head: Normocephalic and atraumatic  Cardiovascular: Normal rate, regular rhythm and normal heart sounds  Pulmonary/Chest: Effort normal and breath sounds normal    Abdominal: Soft  Bowel sounds are normal  There is tenderness  Tenderness left lateral abdomen   Neurological: She is alert and oriented to person, place, and time  Skin: Skin is warm and dry  Psychiatric: She has a normal mood and affect  Her behavior is normal  Judgment and thought content normal    Nursing note and vitals reviewed

## 2018-07-05 LAB
ALBUMIN SERPL-MCNC: 4.8 G/DL (ref 3.5–5.5)
ALBUMIN/GLOB SERPL: 2.1 {RATIO} (ref 1.2–2.2)
ALP SERPL-CCNC: 45 IU/L (ref 39–117)
ALT SERPL-CCNC: 13 IU/L (ref 0–32)
AST SERPL-CCNC: 20 IU/L (ref 0–40)
BASOPHILS # BLD AUTO: 0 X10E3/UL (ref 0–0.2)
BASOPHILS NFR BLD AUTO: 1 %
BILIRUB SERPL-MCNC: 2.3 MG/DL (ref 0–1.2)
BUN SERPL-MCNC: 11 MG/DL (ref 6–24)
BUN/CREAT SERPL: 13 (ref 9–23)
CALCIUM SERPL-MCNC: 9.4 MG/DL (ref 8.7–10.2)
CHLORIDE SERPL-SCNC: 103 MMOL/L (ref 96–106)
CO2 SERPL-SCNC: 22 MMOL/L (ref 20–29)
CREAT SERPL-MCNC: 0.87 MG/DL (ref 0.57–1)
EBV VCA IGG SER IA-ACNC: 98.4 U/ML (ref 0–17.9)
EOSINOPHIL # BLD AUTO: 0.1 X10E3/UL (ref 0–0.4)
EOSINOPHIL NFR BLD AUTO: 2 %
ERYTHROCYTE [DISTWIDTH] IN BLOOD BY AUTOMATED COUNT: 13.2 % (ref 12.3–15.4)
GLOBULIN SER-MCNC: 2.3 G/DL (ref 1.5–4.5)
GLUCOSE SERPL-MCNC: 73 MG/DL (ref 65–99)
HCT VFR BLD AUTO: 43 % (ref 34–46.6)
HGB BLD-MCNC: 14.3 G/DL (ref 11.1–15.9)
IMM GRANULOCYTES # BLD: 0 X10E3/UL (ref 0–0.1)
IMM GRANULOCYTES NFR BLD: 0 %
LYMPHOCYTES # BLD AUTO: 1.4 X10E3/UL (ref 0.7–3.1)
LYMPHOCYTES NFR BLD AUTO: 32 %
MCH RBC QN AUTO: 29.9 PG (ref 26.6–33)
MCHC RBC AUTO-ENTMCNC: 33.3 G/DL (ref 31.5–35.7)
MCV RBC AUTO: 90 FL (ref 79–97)
MONOCYTES # BLD AUTO: 0.5 X10E3/UL (ref 0.1–0.9)
MONOCYTES NFR BLD AUTO: 10 %
NEUTROPHILS # BLD AUTO: 2.4 X10E3/UL (ref 1.4–7)
NEUTROPHILS NFR BLD AUTO: 55 %
PLATELET # BLD AUTO: 178 X10E3/UL (ref 150–379)
POTASSIUM SERPL-SCNC: 3.7 MMOL/L (ref 3.5–5.2)
PROT SERPL-MCNC: 7.1 G/DL (ref 6–8.5)
RBC # BLD AUTO: 4.79 X10E6/UL (ref 3.77–5.28)
SL AMB EGFR AFRICAN AMERICAN: 96 ML/MIN/1.73
SL AMB EGFR NON AFRICAN AMERICAN: 84 ML/MIN/1.73
SODIUM SERPL-SCNC: 142 MMOL/L (ref 134–144)
T3FREE SERPL-MCNC: 2.1 PG/ML (ref 2–4.4)
T4 FREE SERPL-MCNC: 1.02 NG/DL (ref 0.82–1.77)
THYROPEROXIDASE AB SERPL-ACNC: 76 IU/ML (ref 0–34)
TSH SERPL DL<=0.005 MIU/L-ACNC: 1.25 UIU/ML (ref 0.45–4.5)
WBC # BLD AUTO: 4.4 X10E3/UL (ref 3.4–10.8)

## 2018-07-11 ENCOUNTER — HOSPITAL ENCOUNTER (OUTPATIENT)
Dept: CT IMAGING | Facility: HOSPITAL | Age: 38
Discharge: HOME | End: 2018-07-11
Payer: COMMERCIAL

## 2018-07-11 DIAGNOSIS — R10.9 LEFT LATERAL ABDOMINAL PAIN: ICD-10-CM

## 2018-07-11 PROCEDURE — 74177 CT ABD & PELVIS W/CONTRAST: CPT

## 2018-07-11 RX ADMIN — IOHEXOL 100 ML: 350 INJECTION, SOLUTION INTRAVENOUS at 09:47

## 2018-07-26 ENCOUNTER — TELEPHONE (OUTPATIENT)
Dept: GASTROENTEROLOGY | Facility: CLINIC | Age: 38
End: 2018-07-26

## 2018-07-26 DIAGNOSIS — R10.9 ACUTE ABDOMINAL PAIN: Primary | ICD-10-CM

## 2018-07-26 NOTE — TELEPHONE ENCOUNTER
Patient will be new to the office, she is having severe abd pain along with weight loss, please assist in scheduling with a female physician   [phone]

## 2018-08-01 ENCOUNTER — OFFICE VISIT (OUTPATIENT)
Dept: GASTROENTEROLOGY | Facility: MEDICAL CENTER | Age: 38
End: 2018-08-01
Payer: COMMERCIAL

## 2018-08-01 VITALS
HEART RATE: 81 BPM | TEMPERATURE: 98.2 F | WEIGHT: 118.6 LBS | SYSTOLIC BLOOD PRESSURE: 102 MMHG | DIASTOLIC BLOOD PRESSURE: 60 MMHG | HEIGHT: 68 IN | BODY MASS INDEX: 17.98 KG/M2

## 2018-08-01 DIAGNOSIS — R10.13 EPIGASTRIC PAIN: ICD-10-CM

## 2018-08-01 DIAGNOSIS — R19.4 CHANGE IN BOWEL HABITS: Primary | ICD-10-CM

## 2018-08-01 DIAGNOSIS — E80.6 HYPERBILIRUBINEMIA: ICD-10-CM

## 2018-08-01 PROCEDURE — 99204 OFFICE O/P NEW MOD 45 MIN: CPT | Performed by: INTERNAL MEDICINE

## 2018-08-01 NOTE — PROGRESS NOTES
[facility] - Outpatient Consultation  Nikita Class 34 y o  female MRN: [id number]  Encounter: [id number]          ASSESSMENT AND PLAN:      EGD colonoscopy if stool studies are negative  1  Change in bowel habits-she has had intermittent diarrhea which is nonbloody for the last 6 weeks with multiple episodes per day induced by oral intake  She has tried to avoid meat as this has helped her symptoms  Prior to this she did not have diarrhea  Symptoms have not improved despite trying antispasm medications  Denies any fevers or sick contacts or travel  She recalls being sick after ingesting a cookie which was given to her by her neighbor   - Clostridium difficile toxin by PCR; Future  - Stool Enteric Bacterial Panel by PCR; Future  - Giardia antigen; Future  - Ova and parasite examination; Future  - Celiac Disease Antibody Profile; Future  - H  pylori antigen, stool; Future    2  Hyperbilirubinemia-secondary to Gilbert's as this mostly hyperbilirubinemia secondary to indirect bilirubin     3  Epigastric pain-will check lipase  EGD and colonoscopy planned to rule out microscopic colitis, infectious colitis such as viral etiology if stool studies are negative and symptoms persist   Discuss FODMAP diet and probiotics  She is already taking probiotics a continue taking this for now  She has not been taking significant NSAIDs but NSAID may be attribute to her symptoms   - Lipase; Future    ______________________________________________________________________    HPI:      She is a 75-year-old female despite having nonbloody diarrhea for the last 6 weeks  Symptoms started abruptly after ingesting a cookie  She has multiple episodes of diarrhea induced by oral intake  Symptoms sound like postinfectious IBS versus ongoing infection  No fevers or chills  Denies any previous colonoscopy or travel  Previous history of cholecystectomy otherwise doing well        REVIEW OF SYSTEMS:    CONSTITUTIONAL: Denies any fever, chills, rigors, and weight loss  HEENT: No earache or tinnitus  Denies hearing loss or visual disturbances  CARDIOVASCULAR: No chest pain or palpitations  RESPIRATORY: Denies any cough, hemoptysis, shortness of breath or dyspnea on exertion  GASTROINTESTINAL: As noted in the History of Present Illness  GENITOURINARY: No problems with urination  Denies any hematuria or dysuria  NEUROLOGIC: No dizziness or vertigo, denies headaches  MUSCULOSKELETAL: Denies any muscle or joint pain  SKIN: Denies skin rashes or itching  ENDOCRINE: Denies excessive thirst  Denies intolerance to heat or cold  PSYCHOSOCIAL: Denies depression or anxiety  Denies any recent memory loss         Historical Information   Past Medical History:   Diagnosis Date    Anxiety     Depression     Disease of thyroid gland     Hashimoto's    Gall stones     Gestational diabetes mellitus, class A1     Last Assessed: 04 Feb 2016    History of HPV infection     History of posttraumatic stress disorder (PTSD)     Migraine     Last Assessed: 22 Jan 7766    Umbilical hernia      Past Surgical History:   Procedure Laterality Date    CERVICAL CONIZATION   W/ LASER      MULTIPLE TOOTH EXTRACTIONS      NV LAP,CHOLECYSTECTOMY N/A 1/25/2017    Procedure: CHOLECYSTECTOMY LAPAROSCOPIC;  Surgeon: Terell Benavides MD;  [facility];  Service: General    NV LAP,CHOLECYSTECTOMY/GRAPH N/A 1/25/2017    Procedure: CHOLANGIOGRAM;  Surgeon: Terell Benavides MD;  [facility];  Service: General    NV REPAIR UMBILICAL XXEA,7+T/Z,CVRHL N/A 1/25/2017    Procedure: OPEN UMBILICAL HERNIA REPAIR ;  Surgeon: Terell Benavides MD;  [facility];  Service: General    VAGINAL DELIVERY      X 2     Social History   History   Alcohol Use No     Comment: Per [employer]: socially     History   Drug Use No     History   Smoking Status    Never Smoker   Smokeless Tobacco    Never Used Family History   Problem Relation Age of Onset    Thyroid disease Mother     Heart murmur Father     Breast cancer Paternal Grandmother     Skin cancer Paternal Grandmother     Colon cancer Paternal Grandfather        Meds/Allergies       Current Outpatient Prescriptions:     acetaminophen (TYLENOL) 500 mg tablet    albuterol (PROAIR HFA) 90 mcg/act inhaler    azithromycin (ZITHROMAX) 250 mg tablet    benzonatate (TESSALON PERLES) 100 mg capsule    hydrocortisone 2 5 % lotion    hyoscyamine (ANASPAZ,LEVSIN) 0 125 MG tablet    ibuprofen (MOTRIN) 600 mg tablet    LORazepam (ATIVAN) 0 5 mg tablet    Turmeric 500 MG TABS    Zinc 50 MG TABS    Allergies   Allergen Reactions    Septra [Sulfamethoxazole-Trimethoprim] Rash    Sulfa Antibiotics Rash     Category: Allergy;            Objective     Blood pressure 102/60, pulse 81, temperature 98 2 °F (36 8 °C), temperature source Tympanic, height 5' 7 5" (1 715 m), weight 53 8 kg (118 lb 9 6 oz)  Body mass index is 18 3 kg/m²  PHYSICAL EXAM:      General Appearance:   Alert, cooperative, no distress   HEENT:   Normocephalic, atraumatic, anicteric      Neck:  Supple, symmetrical, trachea midline   Lungs:   Clear to auscultation bilaterally; no rales, rhonchi or wheezing; respirations unlabored    Heart[de-identified]   Regular rate and rhythm; no murmur, rub, or gallop  Abdomen:   Soft, non-tender, non-distended; normal bowel sounds; no masses, no organomegaly    Genitalia:   Deferred    Rectal:   Deferred    Extremities:  No cyanosis, clubbing or edema    Pulses:  2+ and symmetric    Skin:  No jaundice, rashes, or lesions    Lymph nodes:  No palpable cervical lymphadenopathy        Lab Results:   No visits with results within 1 Day(s) from this visit     Latest known visit with results is:   Office Visit on 06/28/2018   Component Date Value     AMB LAB WHITE BLOOD C* 07/03/2018 4 4     SL AMB LAB RED BLOOD POOL* 07/03/2018 4 79     Hemoglobin 07/03/2018 14 3     Hematocrit 07/03/2018 43 0     MCV 07/03/2018 90     MCH 07/03/2018 29 9     MCHC 07/03/2018 33 3     RDW 07/03/2018 13 2     Platelet Count 61/28/4698 178     Neutrophils 07/03/2018 55     Lymphocytes 07/03/2018 32     Monocytes 07/03/2018 10     Eosinophils 07/03/2018 2     Basophils 07/03/2018 1     Neutrophils (Absolute) 07/03/2018 2 4     Lymphocytes (Absolute) 07/03/2018 1 4     Monocytes (Absolute) 07/03/2018 0 5     Eosinophils (Absolute) 07/03/2018 0 1     Basophils (Absolute) 07/03/2018 0 0     IMM  GRANULOCYTES (CD4/8) 07/03/2018 0     IIMM  GRANS,ABS (CD4/8) 07/03/2018 0 0     Free t4 07/03/2018 1 02     Free T3 07/03/2018 2 1     TSH 07/03/2018 1 250     THYROID MICROSOMAL ANTIB* 07/03/2018 76*    SL AMB GLUCOSE 07/03/2018 73     BUN 07/03/2018 11     Creatinine, Serum 07/03/2018 0 87     eGFR Non African American 07/03/2018 84     SL AMB EGFR  AMER* 07/03/2018 96     SL AMB BUN/CREATININE RA* 07/03/2018 13     SL AMB SODIUM 07/03/2018 142     SL AMB POTASSIUM 07/03/2018 3 7     SL AMB CHLORIDE 07/03/2018 103     SL AMB CARBON DIOXIDE 07/03/2018 22     CALCIUM 07/03/2018 9 4     SL AMB PROTEIN, TOTAL 07/03/2018 7 1     Serum Albumin 07/03/2018 4 8     Globulin, Total 07/03/2018 2 3     SL AMB ALBUMIN/GLOBULIN * 07/03/2018 2 1     SL AMB BILIRUBIN, TOTAL 07/03/2018 2 3*    Alk Phos Isoenzymes 07/03/2018 45     SL AMB AST 07/03/2018 20     SL AMB ALT 07/03/2018 13     EBV VCA IgG 07/03/2018 98 4*         Radiology Results:   Ct Abdomen Pelvis W Contrast    Result Date: 7/12/2018  Narrative: CT ABDOMEN AND PELVIS WITH IV CONTRAST INDICATION:   Left-sided abdominal pain  COMPARISON: Ultrasound 12/12/2016  TECHNIQUE:  CT examination of the abdomen and pelvis was performed  Axial, sagittal, and coronal 2D reformatted images were created from the source data and submitted for interpretation   Radiation dose length product (DLP) for this visit:  232 65 mGy-cm   This examination, like all CT scans performed in the [facility], was performed utilizing techniques to minimize radiation dose exposure, including the use of iterative  reconstruction and automated exposure control  IV Contrast:  100 mL of iohexol (OMNIPAQUE) Enteric Contrast:  Enteric contrast was administered  FINDINGS: ABDOMEN LOWER CHEST:  No clinically significant abnormality identified in the visualized lower chest  LIVER/BILIARY TREE:  Unremarkable  GALLBLADDER:  Gallbladder is surgically absent  SPLEEN:  Unremarkable  PANCREAS:  Unremarkable  ADRENAL GLANDS:  Unremarkable  KIDNEYS/URETERS:  Unremarkable  No hydronephrosis  STOMACH AND BOWEL:  Unremarkable  APPENDIX:  No findings to suggest appendicitis  ABDOMINOPELVIC CAVITY:  No ascites or free intraperitoneal air  No lymphadenopathy  VESSELS:  Unremarkable for patient's age  PELVIS REPRODUCTIVE ORGANS:  Unremarkable for patient's age  URINARY BLADDER:  Unremarkable  ABDOMINAL WALL/INGUINAL REGIONS:  Unremarkable  OSSEOUS STRUCTURES:  No acute fracture or destructive osseous lesion  Impression: Unremarkable abdomen and pelvis   Workstation performed: [id number]

## 2018-08-01 NOTE — LETTER
August 1, 2018     [address] [place] [address]    Patient: Beth Robertson   YOB: 1980   Date of Visit: 8/1/2018       Dear Dr Tiburcio Hairston: Thank you for referring Beth Robertson to me for evaluation  Below are my notes for this consultation  If you have questions, please do not hesitate to call me  I look forward to following your patient along with you  Sincerely,        Autry Jeans, MD        CC: No Recipients  Autry Jeans, MD  8/1/2018  1:02 PM  Sign at close encounter  [facility] - Outpatient Consultation  Beth Robertson 34 y o  female MRN: [id number]  Encounter: [id number]          ASSESSMENT AND PLAN:      EGD colonoscopy if stool studies are negative  1  Change in bowel habits-she has had intermittent diarrhea which is nonbloody for the last 6 weeks with multiple episodes per day induced by oral intake  She has tried to avoid meat as this has helped her symptoms  Prior to this she did not have diarrhea  Symptoms have not improved despite trying antispasm medications  Denies any fevers or sick contacts or travel  She recalls being sick after ingesting a cookie which was given to her by her neighbor   - Clostridium difficile toxin by PCR; Future  - Stool Enteric Bacterial Panel by PCR; Future  - Giardia antigen; Future  - Ova and parasite examination; Future  - Celiac Disease Antibody Profile; Future  - H  pylori antigen, stool; Future    2  Hyperbilirubinemia-secondary to Gilbert's as this mostly hyperbilirubinemia secondary to indirect bilirubin     3  Epigastric pain-will check lipase  EGD and colonoscopy planned to rule out microscopic colitis, infectious colitis such as viral etiology if stool studies are negative and symptoms persist   Discuss FODMAP diet and probiotics  She is already taking probiotics a continue taking this for now    She has not been taking significant NSAIDs but NSAID may be attribute to her symptoms   - Lipase; Future    ______________________________________________________________________    HPI:      She is a 78-year-old female despite having nonbloody diarrhea for the last 6 weeks  Symptoms started abruptly after ingesting a cookie  She has multiple episodes of diarrhea induced by oral intake  Symptoms sound like postinfectious IBS versus ongoing infection  No fevers or chills  Denies any previous colonoscopy or travel  Previous history of cholecystectomy otherwise doing well  REVIEW OF SYSTEMS:    CONSTITUTIONAL: Denies any fever, chills, rigors, and weight loss  HEENT: No earache or tinnitus  Denies hearing loss or visual disturbances  CARDIOVASCULAR: No chest pain or palpitations  RESPIRATORY: Denies any cough, hemoptysis, shortness of breath or dyspnea on exertion  GASTROINTESTINAL: As noted in the History of Present Illness  GENITOURINARY: No problems with urination  Denies any hematuria or dysuria  NEUROLOGIC: No dizziness or vertigo, denies headaches  MUSCULOSKELETAL: Denies any muscle or joint pain  SKIN: Denies skin rashes or itching  ENDOCRINE: Denies excessive thirst  Denies intolerance to heat or cold  PSYCHOSOCIAL: Denies depression or anxiety  Denies any recent memory loss         Historical Information   Past Medical History:   Diagnosis Date    Anxiety     Depression     Disease of thyroid gland     Hashimoto's    Gall stones     Gestational diabetes mellitus, class A1     Last Assessed: 04 Feb 2016    History of HPV infection     History of posttraumatic stress disorder (PTSD)     Migraine     Last Assessed: 22 Jan 4929    Umbilical hernia      Past Surgical History:   Procedure Laterality Date    CERVICAL CONIZATION   W/ LASER      MULTIPLE TOOTH EXTRACTIONS      DC LAP,CHOLECYSTECTOMY N/A 1/25/2017    Procedure: CHOLECYSTECTOMY LAPAROSCOPIC;  Surgeon: Dylon Bowie MD;  Location:  MAIN OR;  Service: General    DC LAP,CHOLECYSTECTOMY/GRAPH N/A 1/25/2017    Procedure: CHOLANGIOGRAM;  Surgeon: Conner Chandler MD;  [facility];  Service: General    FL REPAIR UMBILICAL YRHI,0+L/D,QJTSP N/A 1/25/2017    Procedure: OPEN UMBILICAL HERNIA REPAIR ;  Surgeon: Conner Chandler MD;  [facility];  Service: General    VAGINAL DELIVERY      X 2     Social History   History   Alcohol Use No     Comment: Per Allscripts: socially     History   Drug Use No     History   Smoking Status    Never Smoker   Smokeless Tobacco    Never Used     Family History   Problem Relation Age of Onset    Thyroid disease Mother     Heart murmur Father     Breast cancer Paternal Grandmother     Skin cancer Paternal Grandmother     Colon cancer Paternal Grandfather        Meds/Allergies       Current Outpatient Prescriptions:     acetaminophen (TYLENOL) 500 mg tablet    albuterol (PROAIR HFA) 90 mcg/act inhaler    azithromycin (ZITHROMAX) 250 mg tablet    benzonatate (TESSALON PERLES) 100 mg capsule    hydrocortisone 2 5 % lotion    hyoscyamine (ANASPAZ,LEVSIN) 0 125 MG tablet    ibuprofen (MOTRIN) 600 mg tablet    LORazepam (ATIVAN) 0 5 mg tablet    Turmeric 500 MG TABS    Zinc 50 MG TABS    Allergies   Allergen Reactions    Septra [Sulfamethoxazole-Trimethoprim] Rash    Sulfa Antibiotics Rash     Category: Allergy;            Objective     Blood pressure 102/60, pulse 81, temperature 98 2 °F (36 8 °C), temperature source Tympanic, height 5' 7 5" (1 715 m), weight 53 8 kg (118 lb 9 6 oz)  Body mass index is 18 3 kg/m²  PHYSICAL EXAM:      General Appearance:   Alert, cooperative, no distress   HEENT:   Normocephalic, atraumatic, anicteric      Neck:  Supple, symmetrical, trachea midline   Lungs:   Clear to auscultation bilaterally; no rales, rhonchi or wheezing; respirations unlabored    Heart[de-identified]   Regular rate and rhythm; no murmur, rub, or gallop     Abdomen:   Soft, non-tender, non-distended; normal bowel sounds; no masses, no organomegaly    Genitalia:   Deferred    Rectal:   Deferred    Extremities:  No cyanosis, clubbing or edema    Pulses:  2+ and symmetric    Skin:  No jaundice, rashes, or lesions    Lymph nodes:  No palpable cervical lymphadenopathy        Lab Results:   No visits with results within 1 Day(s) from this visit  Latest known visit with results is:   Office Visit on 06/28/2018   Component Date Value    SL AMB LAB WHITE BLOOD C* 07/03/2018 4 4     SL AMB LAB RED BLOOD POOL* 07/03/2018 4 79     Hemoglobin 07/03/2018 14 3     Hematocrit 07/03/2018 43 0     MCV 07/03/2018 90     MCH 07/03/2018 29 9     MCHC 07/03/2018 33 3     RDW 07/03/2018 13 2     Platelet Count 47/85/4759 178     Neutrophils 07/03/2018 55     Lymphocytes 07/03/2018 32     Monocytes 07/03/2018 10     Eosinophils 07/03/2018 2     Basophils 07/03/2018 1     Neutrophils (Absolute) 07/03/2018 2 4     Lymphocytes (Absolute) 07/03/2018 1 4     Monocytes (Absolute) 07/03/2018 0 5     Eosinophils (Absolute) 07/03/2018 0 1     Basophils (Absolute) 07/03/2018 0 0     IMM  GRANULOCYTES (CD4/8) 07/03/2018 0     IIMM  GRANS,ABS (CD4/8) 07/03/2018 0 0     Free t4 07/03/2018 1 02     Free T3 07/03/2018 2 1     TSH 07/03/2018 1 250     THYROID MICROSOMAL ANTIB* 07/03/2018 76*    SL AMB GLUCOSE 07/03/2018 73     BUN 07/03/2018 11     Creatinine, Serum 07/03/2018 0 87     eGFR Non African American 07/03/2018 84     SL AMB EGFR  AMER* 07/03/2018 96     SL AMB BUN/CREATININE RA* 07/03/2018 13     SL AMB SODIUM 07/03/2018 142     SL AMB POTASSIUM 07/03/2018 3 7     SL AMB CHLORIDE 07/03/2018 103     SL AMB CARBON DIOXIDE 07/03/2018 22     CALCIUM 07/03/2018 9 4     SL AMB PROTEIN, TOTAL 07/03/2018 7 1     Serum Albumin 07/03/2018 4 8     Globulin, Total 07/03/2018 2 3     SL AMB ALBUMIN/GLOBULIN * 07/03/2018 2 1     SL AMB BILIRUBIN, TOTAL 07/03/2018 2 3*    Alk Phos Isoenzymes 07/03/2018 45     SL AMB AST 07/03/2018 20     SL AMB ALT 07/03/2018 13     EBV VCA IgG 07/03/2018 98 4*         Radiology Results:   Ct Abdomen Pelvis W Contrast    Result Date: 7/12/2018  Narrative: CT ABDOMEN AND PELVIS WITH IV CONTRAST INDICATION:   Left-sided abdominal pain  COMPARISON: Ultrasound 12/12/2016  TECHNIQUE:  CT examination of the abdomen and pelvis was performed  Axial, sagittal, and coronal 2D reformatted images were created from the source data and submitted for interpretation  Radiation dose length product (DLP) for this visit:  232 65 mGy-cm   This examination, like all CT scans performed in the [facility], was performed utilizing techniques to minimize radiation dose exposure, including the use of iterative  reconstruction and automated exposure control  IV Contrast:  100 mL of iohexol (OMNIPAQUE) Enteric Contrast:  Enteric contrast was administered  FINDINGS: ABDOMEN LOWER CHEST:  No clinically significant abnormality identified in the visualized lower chest  LIVER/BILIARY TREE:  Unremarkable  GALLBLADDER:  Gallbladder is surgically absent  SPLEEN:  Unremarkable  PANCREAS:  Unremarkable  ADRENAL GLANDS:  Unremarkable  KIDNEYS/URETERS:  Unremarkable  No hydronephrosis  STOMACH AND BOWEL:  Unremarkable  APPENDIX:  No findings to suggest appendicitis  ABDOMINOPELVIC CAVITY:  No ascites or free intraperitoneal air  No lymphadenopathy  VESSELS:  Unremarkable for patient's age  PELVIS REPRODUCTIVE ORGANS:  Unremarkable for patient's age  URINARY BLADDER:  Unremarkable  ABDOMINAL WALL/INGUINAL REGIONS:  Unremarkable  OSSEOUS STRUCTURES:  No acute fracture or destructive osseous lesion  Impression: Unremarkable abdomen and pelvis   Workstation performed: [id number]

## 2018-08-03 LAB
ENDOMYSIUM IGA SER QL: NEGATIVE
IGA SERPL-MCNC: 198 MG/DL (ref 87–352)
LIPASE SERPL-CCNC: 70 U/L (ref 14–72)
TTG IGA SER-ACNC: <2 U/ML (ref 0–3)

## 2018-08-13 ENCOUNTER — PREP FOR PROCEDURE (OUTPATIENT)
Dept: INTERNAL MEDICINE CLINIC | Facility: CLINIC | Age: 38
End: 2018-08-13

## 2018-08-13 ENCOUNTER — TELEPHONE (OUTPATIENT)
Dept: GASTROENTEROLOGY | Facility: MEDICAL CENTER | Age: 38
End: 2018-08-13

## 2018-08-13 DIAGNOSIS — R19.4 CHANGE IN BOWEL HABIT: Primary | ICD-10-CM

## 2018-08-13 LAB
C DIFF TOX GENS STL QL NAA+PROBE: NEGATIVE
CAMPYLOBACTER STL CULT: NORMAL
G LAMBLIA AG STL QL IA: NEGATIVE
H PYLORI AG STL QL IA: NEGATIVE
Lab: NORMAL
O+P STL CONC: NORMAL
SALM + SHIG STL CULT: NORMAL
SL AMB E COLI SHIGA TOXIN EIA: NEGATIVE

## 2018-08-13 NOTE — TELEPHONE ENCOUNTER
Patient is aware and she stated the next step you told her would be a colon /egd  Please enter orders and she will schedule   Thanks

## 2018-08-13 NOTE — TELEPHONE ENCOUNTER
----- Message from Karthik Alicia MD sent at 8/13/2018 10:17 AM EDT -----  Call patient to report normal results

## 2018-08-14 PROBLEM — R19.4 CHANGE IN BOWEL HABIT: Status: ACTIVE | Noted: 2018-08-14

## 2018-08-14 NOTE — TELEPHONE ENCOUNTER
Spoke to che at [facility] ref#[id number] prior Alisa Marina is approved auth# [id number]            Pt scheduled at [facility] for a col/egd with dr Carlos Kirkland on 9/11/18 suprep was sent to pharmacy and emailed instructions to pt at [email or web address]   Pt is aware she will get a call the day before with exact time of arrival

## 2018-08-22 ENCOUNTER — TELEPHONE (OUTPATIENT)
Dept: GASTROENTEROLOGY | Facility: AMBULARY SURGERY CENTER | Age: 38
End: 2018-08-22

## 2018-08-28 ENCOUNTER — PREP FOR PROCEDURE (OUTPATIENT)
Dept: GASTROENTEROLOGY | Facility: MEDICAL CENTER | Age: 38
End: 2018-08-28

## 2018-08-28 DIAGNOSIS — R19.4 CHANGE IN BOWEL HABITS: Primary | ICD-10-CM

## 2018-08-28 NOTE — TELEPHONE ENCOUNTER
Pt called to schedule her cancelled colon she is not feeling well again  Pt is scheduled with dr Lucio Hernandez on 9/25/18 at [facility], I emailed golytely instructions to pt [email or web address]  Pt is aware she will get a call the day before with exact time of arrival        Please send golytely to pts [facility] on file

## 2018-08-28 NOTE — PATIENT INSTRUCTIONS
Pt called to schedule her cancelled colon she is not feeling well again  Pt is scheduled with dr Aristides Harry on 9/25/18 at [facility], I emailed golytely instructions to [email or web address]    Pt is aware she will get a call the day before with exact time of arrival

## 2018-09-24 ENCOUNTER — ANESTHESIA EVENT (OUTPATIENT)
Dept: GASTROENTEROLOGY | Facility: MEDICAL CENTER | Age: 38
End: 2018-09-24
Payer: COMMERCIAL

## 2018-09-24 RX ORDER — SODIUM CHLORIDE 9 MG/ML
125 INJECTION, SOLUTION INTRAVENOUS CONTINUOUS
Status: CANCELLED | OUTPATIENT
Start: 2018-09-24

## 2018-09-25 ENCOUNTER — ANESTHESIA (OUTPATIENT)
Dept: GASTROENTEROLOGY | Facility: MEDICAL CENTER | Age: 38
End: 2018-09-25
Payer: COMMERCIAL

## 2018-09-25 ENCOUNTER — HOSPITAL ENCOUNTER (OUTPATIENT)
Facility: MEDICAL CENTER | Age: 38
Setting detail: OUTPATIENT SURGERY
Discharge: HOME | End: 2018-09-25
Attending: INTERNAL MEDICINE | Admitting: INTERNAL MEDICINE
Payer: COMMERCIAL

## 2018-09-25 VITALS
RESPIRATION RATE: 16 BRPM | DIASTOLIC BLOOD PRESSURE: 59 MMHG | TEMPERATURE: 98.4 F | HEART RATE: 75 BPM | HEIGHT: 68 IN | OXYGEN SATURATION: 99 % | SYSTOLIC BLOOD PRESSURE: 111 MMHG | WEIGHT: 118 LBS | BODY MASS INDEX: 17.88 KG/M2

## 2018-09-25 DIAGNOSIS — R19.4 CHANGE IN BOWEL HABITS: ICD-10-CM

## 2018-09-25 LAB — EXT PREGNANCY TEST URINE: NEGATIVE

## 2018-09-25 PROCEDURE — 45380 COLONOSCOPY AND BIOPSY: CPT | Performed by: INTERNAL MEDICINE

## 2018-09-25 PROCEDURE — 88342 IMHCHEM/IMCYTCHM 1ST ANTB: CPT | Performed by: PATHOLOGY

## 2018-09-25 PROCEDURE — 43239 EGD BIOPSY SINGLE/MULTIPLE: CPT | Performed by: INTERNAL MEDICINE

## 2018-09-25 PROCEDURE — 81025 URINE PREGNANCY TEST: CPT | Performed by: ANESTHESIOLOGY

## 2018-09-25 PROCEDURE — 88305 TISSUE EXAM BY PATHOLOGIST: CPT | Performed by: PATHOLOGY

## 2018-09-25 RX ORDER — SODIUM CHLORIDE 9 MG/ML
125 INJECTION, SOLUTION INTRAVENOUS CONTINUOUS
Status: DISCONTINUED | OUTPATIENT
Start: 2018-09-25 | End: 2018-09-25 | Stop reason: HOSPADM

## 2018-09-25 RX ORDER — PROPOFOL 10 MG/ML
INJECTION, EMULSION INTRAVENOUS AS NEEDED
Status: DISCONTINUED | OUTPATIENT
Start: 2018-09-25 | End: 2018-09-25 | Stop reason: SURG

## 2018-09-25 RX ADMIN — PROPOFOL 50 MG: 10 INJECTION, EMULSION INTRAVENOUS at 11:58

## 2018-09-25 RX ADMIN — LIDOCAINE HYDROCHLORIDE 100 MG: 20 INJECTION, SOLUTION INTRAVENOUS at 11:49

## 2018-09-25 RX ADMIN — PROPOFOL 120 MG: 10 INJECTION, EMULSION INTRAVENOUS at 11:49

## 2018-09-25 RX ADMIN — PROPOFOL 30 MG: 10 INJECTION, EMULSION INTRAVENOUS at 11:53

## 2018-09-25 RX ADMIN — PROPOFOL 50 MG: 10 INJECTION, EMULSION INTRAVENOUS at 12:03

## 2018-09-25 RX ADMIN — PROPOFOL 50 MG: 10 INJECTION, EMULSION INTRAVENOUS at 12:08

## 2018-09-25 RX ADMIN — SODIUM CHLORIDE: 0.9 INJECTION, SOLUTION INTRAVENOUS at 11:35

## 2018-09-25 RX ADMIN — PROPOFOL 50 MG: 10 INJECTION, EMULSION INTRAVENOUS at 12:15

## 2018-09-25 NOTE — DISCHARGE INSTR - AVS FIRST PAGE
OPERATIVE REPORT  PATIENT NAME: Emilia Harris    :  1980  MRN: [id number]  Pt Location: [facility] GI ROOM 01    SURGERY DATE: 2018    Surgeon(s) and Role:     * Orin Bar MD - Primary    Preop Diagnosis:  Change in bowel habits [R19 4]    Post-Op Diagnosis Codes:     * Change in bowel habits [R19 4]    Procedure(s) (LRB):  EGD AND COLONOSCOPY (N/A)    Specimen(s):  ID Type Source Tests Collected by Time Destination  1 : duodenal, h/o celiac Tissue Duodenum TISSUE EXAM Orin Bar MD 2018 1153     ESOPHAGOGASTRODUODENOSCOPY    PROCEDURE: EGD    SEDATION: Monitored anesthesia care, check anesthesia records    ASA Class: 2    INDICATIONS:  Epigastric pain    CONSENT:  Informed consent was obtained for the procedure, including sedation after explaining the risks and benefits of the procedure  Risks including but not limited to bleeding, perforation, infection, and missed lesion  PREPARATION:   Telemetry, pulse oximetry, blood pressure were monitored throughout the procedure  Patient was identified by myself both verbally and by visual inspection of ID band  DESCRIPTION:   Patient was placed in the left lateral decubitus position and was sedated with the above medication  The gastroscope was introduced in to the oropharynx and the esophagus was intubated under direct visualization  Scope was passed down the esophagus up to 2nd part of the duodenum  A careful inspection was made as the gastroscope was withdrawn, including a retroflexed view of the stomach; findings and interventions are described below  FINDINGS:    #1  Esophagus- normal  #2  Stomach- mild gastritis seen in antrum status post biopsy for H pylori  #3   Duodenum- normal appearing D1 and D2 status post biopsy for celiac disease         IMPRESSIONS:      Mild gastritis biopsy for H pylori  Duodenal biopsies performed for celiac disease    RECOMMENDATIONS:     Follow-up biopsy results    COMPLICATIONS:  None; patient tolerated the procedure well  DISPOSITION: PACU           CONDITION: Stable      SIGNATURE: Traci Yost MD  DATE: 2018  TIME: 11:58 AM      OPERATIVE REPORT  PATIENT NAME: Quang Garcia    :  1980  MRN: [id number]  Pt Location: [facility] GI ROOM 01    SURGERY DATE: 2018    Surgeon(s) and Role:     * Traci Yost MD - Primary    Preop Diagnosis:  Change in bowel habits [R19 4]    Post-Op Diagnosis Codes:     * Change in bowel habits [R19 4]    Procedure(s) (LRB):  EGD AND COLONOSCOPY (N/A)    Specimen(s):  ID Type Source Tests Collected by Time Destination  1 : duodenal, r/o celiac Tissue Duodenum TISSUE EXAM Traci Yost MD 2018 1153   2 : r/o h pylori Tissue Stomach TISSUE EXAM Traci Yost MD 2018 1156   3 : random colon bx, h/o abdominal pain Tissue Colon TISSUE EXAM Traci Yost MD 2018 1214     Colonoscopy Procedure Note    Procedure: Colonoscopy    Sedation: Monitored anesthesia care, check anesthesia records      ASA Class: 2    INDICATIONS:  Abdominal pain on the left side     POST-OP DIAGNOSIS: See the impression below    Procedure Details     Prior colonoscopy: No prior colonoscopy  Informed consent was obtained for the procedure, including sedation  Risks of perforation, hemorrhage, adverse drug reaction and aspiration were discussed  The patient was placed in the left lateral decubitus position  Based on the pre-procedure assessment, including review of the patient's medical history, medications, allergies, and review of systems, she had been deemed to be an appropriate candidate for conscious sedation; she was therefore sedated with the medications listed below  The patient was monitored continuously with telemetry, pulse oximetry, blood pressure monitoring, and direct observations  A rectal examination was performed  The colonoscope was inserted into the rectum and advanced under direct vision to the terminal ileum    The quality of the colonic preparation was good  A careful inspection was made as the colonoscope was withdrawn, including a retroflexed view of the rectum; findings and interventions are described below  Findings:  Normal colonoscopy with normal terminal ileum  Random biopsy performed for microscopic colitis           Complications: None; patient tolerated the procedure well  Impression:    Normal colonoscopy  Random biopsy performed for microscopic colitis  IBS possible cause for abdominal pain versus extra gastrointestinal source  Recommendations:  Repeat colonoscopy in 10 years or sooner if clinically indicated        SIGNATURE: Jayla Reyes MD  DATE: September 25, 2018  TIME: 12:27 PM

## 2018-09-25 NOTE — OP NOTE
OPERATIVE REPORT  PATIENT NAME: Ad Rubio    :  1980  MRN: [id number]  Pt Location: [facility] GI ROOM 01    SURGERY DATE: 2018    Surgeon(s) and Role:     * Skip Reynaga MD - Primary    Preop Diagnosis:  Change in bowel habits [R19 4]    Post-Op Diagnosis Codes:     * Change in bowel habits [R19 4]    Procedure(s) (LRB):  EGD AND COLONOSCOPY (N/A)    Specimen(s):  ID Type Source Tests Collected by Time Destination   1 : duodenal, h/o celiac Tissue Duodenum TISSUE EXAM Skip Reynaga MD 2018 1153      ESOPHAGOGASTRODUODENOSCOPY    PROCEDURE: EGD    SEDATION: Monitored anesthesia care, check anesthesia records    ASA Class: 2    INDICATIONS:  Epigastric pain    CONSENT:  Informed consent was obtained for the procedure, including sedation after explaining the risks and benefits of the procedure  Risks including but not limited to bleeding, perforation, infection, and missed lesion  PREPARATION:   Telemetry, pulse oximetry, blood pressure were monitored throughout the procedure  Patient was identified by myself both verbally and by visual inspection of ID band  DESCRIPTION:   Patient was placed in the left lateral decubitus position and was sedated with the above medication  The gastroscope was introduced in to the oropharynx and the esophagus was intubated under direct visualization  Scope was passed down the esophagus up to 2nd part of the duodenum  A careful inspection was made as the gastroscope was withdrawn, including a retroflexed view of the stomach; findings and interventions are described below  FINDINGS:    #1  Esophagus- normal  #2  Stomach- mild gastritis seen in antrum status post biopsy for H pylori  #3   Duodenum- normal appearing D1 and D2 status post biopsy for celiac disease         IMPRESSIONS:      Mild gastritis biopsy for H pylori  Duodenal biopsies performed for celiac disease    RECOMMENDATIONS:     Follow-up biopsy results    COMPLICATIONS:  None; patient tolerated the procedure well            DISPOSITION: PACU           CONDITION: Stable      SIGNATURE: Skip Reynaga MD  DATE: September 25, 2018  TIME: 11:58 AM

## 2018-09-25 NOTE — H&P
History and Physical - [facility]  Forrest Anguiano 34 y o  female MRN: [id number]                  HPI: Forrest Anguiano is a 34y o  year old female who presents for EGD evaluation for epigastric pain and colonoscopy evaluation for change in bowel habits  REVIEW OF SYSTEMS: Per the HPI, and otherwise unremarkable      Historical Information   Past Medical History:   Diagnosis Date    Anxiety     Depression     Disease of thyroid gland     Hashimoto's    Gall stones     Gestational diabetes mellitus, class A1     Last Assessed: 04 Feb 2016    History of HPV infection     History of posttraumatic stress disorder (PTSD)     Migraine     Last Assessed: 22 Jan 7391    Umbilical hernia      Past Surgical History:   Procedure Laterality Date    CERVICAL CONIZATION   W/ LASER      CHOLECYSTECTOMY      HERNIA REPAIR      MULTIPLE TOOTH EXTRACTIONS      PA LAP,CHOLECYSTECTOMY N/A 1/25/2017    Procedure: CHOLECYSTECTOMY LAPAROSCOPIC;  Surgeon: Georgiana Vanessa MD;  Location: [facility] MAIN OR;  Service: General    PA LAP,CHOLECYSTECTOMY/GRAPH N/A 1/25/2017    Procedure: CHOLANGIOGRAM;  Surgeon: Georgiana Vanessa MD;  Location: [facility];  Service: General    PA REPAIR UMBILICAL QFWM,6+W/T,KZRJF N/A 1/25/2017    Procedure: OPEN UMBILICAL HERNIA REPAIR ;  Surgeon: Georgiana Vanessa MD;  Location: [facility];  Service: General    VAGINAL DELIVERY      X 2     Social History   History   Alcohol Use No     Comment: Per [employer]: socially     History   Drug Use No     History   Smoking Status    Never Smoker   Smokeless Tobacco    Never Used     Family History   Problem Relation Age of Onset    Thyroid disease Mother     Heart murmur Father     Breast cancer Paternal Grandmother     Skin cancer Paternal Grandmother     Colon cancer Paternal Grandfather        Meds/Allergies     Prescriptions Prior to Admission   Medication    ibuprofen (MOTRIN) 600 mg tablet    Turmeric 500 MG TABS    Zinc 50 MG TABS    acetaminophen (TYLENOL) 500 mg tablet    hydrocortisone 2 5 % lotion    hyoscyamine (ANASPAZ,LEVSIN) 0 125 MG tablet    LORazepam (ATIVAN) 0 5 mg tablet    Na Sulfate-K Sulfate-Mg Sulf (SUPREP BOWEL PREP KIT) 17 5-3 13-1 6 GM/180ML SOLN    polyethylene glycol (GOLYTELY) 4000 mL solution       Allergies   Allergen Reactions    Septra [Sulfamethoxazole-Trimethoprim] Rash    Sulfa Antibiotics Rash     Category: Allergy;        Objective     Blood pressure 118/68, pulse 72, temperature 98 4 °F (36 9 °C), temperature source Temporal, resp  rate 16, height 5' 7 75" (1 721 m), weight 53 5 kg (118 lb), SpO2 100 %  PHYSICAL EXAM    Gen: NAD  CV: RRR  CHEST: Clear  ABD: soft, NT/ND  EXT: no edema      ASSESSMENT/PLAN:  This is a 34y o  year old female here for EGD and colonoscopy and she is stable and optimized for her procedure

## 2018-09-25 NOTE — ANESTHESIA PREPROCEDURE EVALUATION
Review of Systems/Medical History  Patient summary reviewed  Chart reviewed  No history of anesthetic complications     Cardiovascular  Negative cardio ROS    Pulmonary  Negative pulmonary ROS        GI/Hepatic    Bowel prep            Endo/Other  Diabetes well controlled type 2 , History of thyroid disease (hashimoto's thyroiditis) , hypothyroidism,   Comment: Hx HPV infectiion    GYN       Hematology  Anemia ,     Musculoskeletal    Arthritis     Neurology    Headaches,    Psychology   Psychiatric history (PTSD), Anxiety, Depression ,              Physical Exam    Airway    Mallampati score: I  TM Distance: >3 FB  Neck ROM: full     Dental   No notable dental hx     Cardiovascular  Comment: Negative ROS, Rhythm: regular, Rate: normal, Cardiovascular exam normal    Pulmonary  Pulmonary exam normal Breath sounds clear to auscultation,     Other Findings        Anesthesia Plan  ASA Score- 2     Anesthesia Type- IV sedation with anesthesia with ASA Monitors  Additional Monitors:   Airway Plan:         Plan Factors-Patient not instructed to abstain from smoking on day of procedure  Patient did not smoke on day of surgery  Induction- intravenous  Postoperative Plan-     Informed Consent- Anesthetic plan and risks discussed with patient

## 2018-09-25 NOTE — DISCHARGE INSTRUCTIONS
Upper Endoscopy   WHAT YOU NEED TO KNOW:   An upper endoscopy is also called an upper gastrointestinal (GI) endoscopy, or an esophagogastroduodenoscopy (EGD)  You may feel bloated, gassy, or have some abdominal discomfort after your procedure  Your throat may be sore for 24 to 36 hours  You may burp or pass gas from air that is still inside your body  DISCHARGE INSTRUCTIONS:   Call 911 if:   · You have sudden chest pain or trouble breathing  Seek care immediately if:   · You feel dizzy or faint  · You have trouble swallowing  · You have severe throat pain  · Your bowel movements are very dark or black  · Your abdomen is hard and firm and you have severe pain  · You vomit blood  Contact your healthcare provider if:   · You feel full or bloated and cannot burp or pass gas  · You have not had a bowel movement for 3 days after your procedure  · You have neck pain  · You have a fever or chills  · You have nausea or are vomiting  · You have a rash or hives  · You have questions or concerns about your endoscopy  Relieve a sore throat:  Suck on throat lozenges or crushed ice  Gargle with a small amount of warm salt water  Mix 1 teaspoon of salt and 1 cup of warm water to make salt water  Relieve gas and discomfort from bloating:  Lie on your right side with a heating pad on your abdomen  Take short walks to help pass gas  Eat small meals until bloating is relieved  Rest after your procedure:  Do not drive or make important decisions until the day after your procedure  Return to your normal activity as directed  You can usually return to work the day after your procedure  Follow up with your healthcare provider as directed:  Write down your questions so you remember to ask them during your visits  © 2017 [employer] Information is for End User's use only and may not be sold, redistributed or otherwise used for commercial purposes   All illustrations and images included in CareNotes® are the copyrighted property of [employer]  or [employer]  The above information is an  only  It is not intended as medical advice for individual conditions or treatments  Talk to your doctor, nurse or pharmacist before following any medical regimen to see if it is safe and effective for you  Gastritis   WHAT YOU NEED TO KNOW:   Gastritis is inflammation or irritation of the lining of your stomach  DISCHARGE INSTRUCTIONS:   Call 911 for any of the following:   · You develop chest pain or shortness of breath  Seek care immediately if:   · You vomit blood  · You have black or bloody bowel movements  · You have severe stomach or back pain  Contact your healthcare provider if:   · You have a fever  · You have new or worsening symptoms, even after treatment  · You have questions or concerns about your condition or care  Medicines:   · Medicines  may be given to help treat a bacterial infection or decrease stomach acid  · Take your medicine as directed  Contact your healthcare provider if you think your medicine is not helping or if you have side effects  Tell him or her if you are allergic to any medicine  Keep a list of the medicines, vitamins, and herbs you take  Include the amounts, and when and why you take them  Bring the list or the pill bottles to follow-up visits  Carry your medicine list with you in case of an emergency  Manage or prevent gastritis:   · Do not smoke  Nicotine and other chemicals in cigarettes and cigars can make your symptoms worse and cause lung damage  Ask your healthcare provider for information if you currently smoke and need help to quit  E-cigarettes or smokeless tobacco still contain nicotine  Talk to your healthcare provider before you use these products  · Do not drink alcohol  Alcohol can prevent healing and make your gastritis worse   Talk to your healthcare provider if you need help to stop drinking  · Do not take NSAIDs or aspirin unless directed  These and similar medicines can cause irritation  If your healthcare provider says it is okay to take NSAIDs, take them with food  · Do not eat foods that cause irritation  Foods such as oranges and salsa can cause burning or pain  Eat a variety of healthy foods  Examples include fruits (not citrus), vegetables, low-fat dairy products, beans, whole-grain breads, and lean meats and fish  Try to eat small meals, and drink water with your meals  Do not eat for at least 3 hours before you go to bed  · Find ways to relax and decrease stress  Stress can increase stomach acid and make gastritis worse  Activities such as yoga, meditation, or listening to music can help you relax  Spend time with friends, or do things you enjoy  Follow up with your healthcare provider as directed: You may need ongoing tests or treatment, or referral to a gastroenterologist  Write down your questions so you remember to ask them during your visits  © 2017 [employer]  Information is for End User's use only and may not be sold, redistributed or otherwise used for commercial purposes  All illustrations and images included in CareNotes® are the copyrighted property of [employer]  or [employer]  The above information is an  only  It is not intended as medical advice for individual conditions or treatments  Talk to your doctor, nurse or pharmacist before following any medical regimen to see if it is safe and effective for you  Colonoscopy   WHAT YOU NEED TO KNOW:   A colonoscopy is a procedure to examine the inside of your colon (intestine) with a scope  Polyps or tissue growths may have been removed during your colonoscopy  It is normal to feel bloated and to have some abdominal discomfort  You should be passing gas  If you have hemorrhoids or you had polyps removed, you may have a small amount of bleeding  DISCHARGE INSTRUCTIONS:   Seek care immediately if:   · You have a large amount of bright red blood in your bowel movements  · Your abdomen is hard and firm and you have severe pain  · You have sudden trouble breathing  Contact your healthcare provider if:   · You develop a rash or hives  · You have a fever within 24 hours of your procedure  · You have not had a bowel movement for 3 days after your procedure  · You have questions or concerns about your condition or care  Activity:   · Do not lift, strain, or run  for 3 days after your procedure  · Rest after your procedure  You have been given medicine to relax you  Do not  drive or make important decisions until the day after your procedure  Return to your normal activity as directed  · Relieve gas and discomfort from bloating  by lying on your right side with a heating pad on your abdomen  You may need to take short walks to help the gas move out  Eat small meals until bloating is relieved  If you had polyps removed: For 7 days after your procedure:  · Do not  take aspirin  · Do not  go on long car rides  Help prevent constipation:   · Eat a variety of healthy foods  Healthy foods include fruit, vegetables, whole-grain breads, low-fat dairy products, beans, lean meat, and fish  Ask if you need to be on a special diet  Your healthcare provider may recommend that you eat high-fiber foods such as cooked beans  Fiber helps you have regular bowel movements  · Drink liquids as directed  Adults should drink between 9 and 13 eight-ounce cups of liquid every day  Ask what amount is best for you  For most people, good liquids to drink are water, juice, and milk  · Exercise as directed  Talk to your healthcare provider about the best exercise plan for you  Exercise can help prevent constipation, decrease your blood pressure and improve your health    Follow up with your healthcare provider as directed:  Write down your questions so you remember to ask them during your visits  © 2017 [facility] [employer] Information is for End User's use only and may not be sold, redistributed or otherwise used for commercial purposes  All illustrations and images included in CareNotes® are the copyrighted property of [employer] Inc  or [employer]  The above information is an  only  It is not intended as medical advice for individual conditions or treatments  Talk to your doctor, nurse or pharmacist before following any medical regimen to see if it is safe and effective for you

## 2018-09-25 NOTE — OP NOTE
OPERATIVE REPORT  PATIENT NAME: Ju Padilla    :  1980  MRN: [id number]  Pt Location: [facility] GI ROOM 01    SURGERY DATE: 2018    Surgeon(s) and Role:     * Laurent Goodwin MD - Primary    Preop Diagnosis:  Change in bowel habits [R19 4]    Post-Op Diagnosis Codes:     * Change in bowel habits [R19 4]    Procedure(s) (LRB):  EGD AND COLONOSCOPY (N/A)    Specimen(s):  ID Type Source Tests Collected by Time Destination   1 : duodenal, r/o celiac Tissue Duodenum TISSUE EXAM Laurent Goodwin MD 2018 1153    2 : r/o h pylori Tissue Stomach TISSUE EXAM Laurent Goodwin MD 2018 1156    3 : random colon bx, h/o abdominal pain Tissue Colon TISSUE EXAM Laurent Goodwin MD 2018 1214      Colonoscopy Procedure Note    Procedure: Colonoscopy    Sedation: Monitored anesthesia care, check anesthesia records      ASA Class: 2    INDICATIONS:  Abdominal pain on the left side     POST-OP DIAGNOSIS: See the impression below    Procedure Details     Prior colonoscopy: No prior colonoscopy  Informed consent was obtained for the procedure, including sedation  Risks of perforation, hemorrhage, adverse drug reaction and aspiration were discussed  The patient was placed in the left lateral decubitus position  Based on the pre-procedure assessment, including review of the patient's medical history, medications, allergies, and review of systems, she had been deemed to be an appropriate candidate for conscious sedation; she was therefore sedated with the medications listed below  The patient was monitored continuously with telemetry, pulse oximetry, blood pressure monitoring, and direct observations  A rectal examination was performed  The colonoscope was inserted into the rectum and advanced under direct vision to the terminal ileum  The quality of the colonic preparation was good    A careful inspection was made as the colonoscope was withdrawn, including a retroflexed view of the rectum; findings and interventions are described below  Findings:  Normal colonoscopy with normal terminal ileum  Random biopsy performed for microscopic colitis           Complications: None; patient tolerated the procedure well  Impression:    Normal colonoscopy  Random biopsy performed for microscopic colitis  IBS possible cause for abdominal pain versus extra gastrointestinal source  Recommendations:  Repeat colonoscopy in 10 years or sooner if clinically indicated        SIGNATURE: Jannet Talbert MD  DATE: September 25, 2018  TIME: 12:27 PM

## 2019-02-01 ENCOUNTER — OFFICE VISIT (OUTPATIENT)
Dept: FAMILY MEDICINE CLINIC | Facility: CLINIC | Age: 39
End: 2019-02-01
Payer: COMMERCIAL

## 2019-02-01 VITALS
OXYGEN SATURATION: 99 % | BODY MASS INDEX: 18.3 KG/M2 | WEIGHT: 120.75 LBS | SYSTOLIC BLOOD PRESSURE: 110 MMHG | TEMPERATURE: 98.3 F | HEIGHT: 68 IN | DIASTOLIC BLOOD PRESSURE: 76 MMHG | HEART RATE: 75 BPM

## 2019-02-01 DIAGNOSIS — E03.9 ACQUIRED HYPOTHYROIDISM: ICD-10-CM

## 2019-02-01 DIAGNOSIS — R19.4 CHANGE IN BOWEL HABITS: ICD-10-CM

## 2019-02-01 DIAGNOSIS — E06.3 HASHIMOTO'S THYROIDITIS: ICD-10-CM

## 2019-02-01 DIAGNOSIS — R74.01 ELEVATED ALT MEASUREMENT: ICD-10-CM

## 2019-02-01 DIAGNOSIS — Z00.00 WELL ADULT EXAM: Primary | ICD-10-CM

## 2019-02-01 DIAGNOSIS — E06.3 CHRONIC LYMPHOCYTIC THYROIDITIS: ICD-10-CM

## 2019-02-01 DIAGNOSIS — Z77.21 EXPOSURE TO BLOOD OR BODY FLUID: ICD-10-CM

## 2019-02-01 DIAGNOSIS — E80.6 HYPERBILIRUBINEMIA: ICD-10-CM

## 2019-02-01 PROCEDURE — 99396 PREV VISIT EST AGE 40-64: CPT | Performed by: FAMILY MEDICINE

## 2019-02-01 NOTE — PROGRESS NOTES
Subjective     Rebekah Nolasco is a 34 y o   female and is here for routine health maintenance  The patient reports problems - found out  has been cheating on her, would like std check with blood test  History of Present Illness     Changed diet drastically, no dairy, no eggs, no gluten  Chicken, fish ok         Well Adult Physical   Patient here for a comprehensive physical exam       Diet and Physical Activity  Diet: well balanced diet  Weight concerns: None, patient's BMI is between 18 5-24 9  Exercise: infrequently      Depression Screen  PHQ-9 Depression Screening    PHQ-9:    Frequency of the following problems over the past two weeks:               General Health  Hearing: Normal:  bilateral  Vision: no vision problems and wears contacts  Dental: regular dental visits     History:  LMP: No LMP recorded  Cancer Screening  Colononoscopy not indicated  Mammogram will need mammogram   Pap done recently by gyn in December  Abnormal pap? no  Smoker no Annual screening with low-dose helical computed tomography (CT) for patients age 54 to 76 years with history of smoking at least 30 pack-years and, if a former smoker, had quit within the previous 15 years      The following portions of the patient's history were reviewed and updated as appropriate: allergies, current medications, past family history, past medical history, past social history, past surgical history and problem list     Review of Systems     Review of Systems   Constitutional: Negative  Negative for fatigue and fever  HENT: Negative  Eyes: Negative  Respiratory: Negative  Negative for cough  Cardiovascular: Negative  Gastrointestinal: Negative  Endocrine: Negative  Genitourinary: Negative  Musculoskeletal: Negative  Skin: Negative  Allergic/Immunologic: Negative  Neurological: Negative  Psychiatric/Behavioral: Negative          Past Medical History     Past Medical History:   Diagnosis Date  Anxiety     Depression     Disease of thyroid gland     Hashimoto's    Gall stones     Gestational diabetes mellitus, class A1     Last Assessed: 04 Feb 2016    History of HPV infection     History of posttraumatic stress disorder (PTSD)     Migraine     Last Assessed: 22 Jan 6965    Umbilical hernia        Past Surgical History     Past Surgical History:   Procedure Laterality Date    CERVICAL CONIZATION   W/ LASER      CHOLECYSTECTOMY      EGD AND COLONOSCOPY N/A 9/25/2018    Procedure: EGD AND COLONOSCOPY;  Surgeon: Sebastian Cristobal MD;  Location: [facility] GI [facility];   Service: Gastroenterology    HERNIA REPAIR      MULTIPLE TOOTH EXTRACTIONS      WI LAP,CHOLECYSTECTOMY N/A 1/25/2017    Procedure: Garcia [place];  Surgeon: Irwin Jones MD;  [facility];  Service: General    WI LAP,CHOLECYSTECTOMY/GRAPH N/A 1/25/2017    Procedure: CHOLANGIOGRAM;  Surgeon: Irwin Jones MD;  [facility];  Service: General    WI REPAIR UMBILICAL CHVM,9+P/G,QVHZP N/A 1/25/2017    Procedure: OPEN UMBILICAL HERNIA REPAIR ;  Surgeon: Irwin Jones MD;  [facility];  Service: General    VAGINAL DELIVERY      X 2       Social History     Social History     Social History    Marital status: /Civil Union     Spouse name: N/A    Number of children: N/A    Years of education: N/A     Social History Main Topics    Smoking status: Never Smoker    Smokeless tobacco: Never Used    Alcohol use No      Comment: Per [employer]: socially    Drug use: No    Sexual activity: Not Asked     Other Topics Concern    None     Social History Narrative    None       Family History     Family History   Problem Relation Age of Onset    Thyroid disease Mother     Heart murmur Father     Breast cancer Paternal Grandmother     Skin cancer Paternal Grandmother     Colon cancer Paternal Grandfather        Current Medications       Current Outpatient Prescriptions:     Ascorbic Acid (VITAMIN C PO), Take by mouth, Disp: , Rfl:     ibuprofen (MOTRIN) 600 mg tablet, Take 1 tablet (600 mg total) by mouth 3 (three) times a day, Disp: 90 tablet, Rfl: 0    LORazepam (ATIVAN) 0 5 mg tablet, Take by mouth, Disp: , Rfl:     Turmeric 500 MG TABS, Take by mouth, Disp: , Rfl:     Zinc 50 MG TABS, Take by mouth, Disp: , Rfl:     acetaminophen (TYLENOL) 500 mg tablet, Take 500 mg by mouth every 6 (six) hours as needed for mild pain, Disp: , Rfl:     hydrocortisone 2 5 % lotion, Apply topically, Disp: , Rfl:      Allergies     Allergies   Allergen Reactions    Septra [Sulfamethoxazole-Trimethoprim] Rash    Sulfa Antibiotics Rash     Category: Allergy;        Objective     /76   Pulse 75   Temp 98 3 °F (36 8 °C)   Ht 5' 7 75" (1 721 m)   Wt 54 8 kg (120 lb 12 oz)   SpO2 99%   BMI 18 50 kg/m²      Physical Exam   Constitutional: She is oriented to person, place, and time  She appears well-developed and well-nourished  HENT:   Head: Normocephalic and atraumatic  Right Ear: External ear normal    Left Ear: External ear normal    Nose: Nose normal    Mouth/Throat: Oropharynx is clear and moist    Eyes: Pupils are equal, round, and reactive to light  Conjunctivae and EOM are normal    Neck: Normal range of motion  Neck supple  Cardiovascular: Normal rate, regular rhythm, normal heart sounds and intact distal pulses  Pulmonary/Chest: Effort normal and breath sounds normal    Abdominal: Soft  Bowel sounds are normal    Musculoskeletal: Normal range of motion  Neurological: She is alert and oriented to person, place, and time  Skin: Skin is warm and dry  Psychiatric: She has a normal mood and affect  Her behavior is normal  Judgment and thought content normal    Nursing note and vitals reviewed          No exam data present    Health Maintenance     Health Maintenance   Topic Date Due    INFLUENZA VACCINE  07/01/2018    DTaP,Tdap,and Td Vaccines (2 - Td) 01/19/2026 Immunization History   Administered Date(s) Administered     Influenza (IM) Preservative Free 01/14/2013    Tdap 01/19/2016       Assessment/Plan         1  Healthy female exam   2  Patient Counseling:   · Nutrition: Stressed importance of a well balanced diet, moderation of sodium/saturated fat, caloric balance and sufficient intake of fiber  · Exercise: Stressed the importance of regular exercise with a goal of 150 minutes per week  · Dental Health: Discussed daily flossing and brushing and regular dental visits     · Immunizations reviewed  · Discussed benefits of screening   · Discussed the patient's BMI with her  The BMI is in the acceptable range  3  Cancer Screening   4  Labs - blood work done today  5 Follow up as needed for acute illness      Loni Boles DO

## 2019-02-01 NOTE — PATIENT INSTRUCTIONS
Blood work , additional blood work- lab [facility] Visit for [facility]:   A wellness visit  is when you see your healthcare provider to get screened for health problems  You can also get advice on how to stay healthy  Write down your questions so you remember to ask them  Ask your healthcare provider how often you should have a wellness visit  What happens at a wellness visit:  Your healthcare provider will ask about your health, and your family history of health problems  This includes high blood pressure, heart disease, and cancer  He or she will ask if you have symptoms that concern you, if you smoke, and about your mood  You may also be asked about your intake of medicines, supplements, food, and alcohol  Any of the following may be done:  · Your weight  will be checked  Your height may also be checked so your body mass index (BMI) can be calculated  Your BMI shows if you are at a healthy weight  · Your blood pressure  and heart rate will be checked  Your temperature may also be checked  · Blood and urine tests  may be done  Blood tests may be done to check your cholesterol levels  Abnormal cholesterol levels increase your risk for heart disease and stroke  You may also need a blood or urine test to check for diabetes if you are at increased risk  Urine tests may be done to look for signs of an infection or kidney disease  · A physical exam  includes checking your heartbeat and lungs with a stethoscope  Your healthcare provider may also check your skin to look for sun damage  · Screening tests  may be recommended  A screening test is done to check for diseases that may not cause symptoms  The screening tests you may need depend on your age, gender, family history, and lifestyle habits  For example, colorectal screening may be recommended if you are 48years old or older  Screening tests you need if you are a woman:   · A Pap smear  is used to screen for cervical cancer  Pap smears are usually done every 3 to 5 years depending on your age  You may need them more often if you have had abnormal Pap smear test results in the past  Ask your healthcare provider how often you should have a Pap smear  · A mammogram  is an x-ray of your breasts to screen for breast cancer  Experts recommend mammograms every 2 years starting at age 48 years  You may need a mammogram at age 52 years or younger if you have an increased risk for breast cancer  Talk to your healthcare provider about when you should start having mammograms and how often you need them  Vaccines you may need:   · Get an influenza vaccine  every year  The influenza vaccine protects you from the flu  Several types of viruses cause the flu  The viruses change over time, so new vaccines are made each year  · Get a tetanus-diphtheria (Td) booster vaccine  every 10 years  This vaccine protects you against tetanus and diphtheria  Tetanus is a severe infection that may cause painful muscle spasms and lockjaw  Diphtheria is a severe bacterial infection that causes a thick covering in the back of your mouth and throat  · Get a human papillomavirus (HPV) vaccine  if you are female and aged 23 to 32 or male 23 to 24 and never received it  This vaccine protects you from HPV infection  HPV is the most common infection spread by sexual contact  HPV may also cause vaginal, penile, and anal cancers  · Get a pneumococcal vaccine  if you are aged 72 years or older  The pneumococcal vaccine is an injection given to protect you from pneumococcal disease  Pneumococcal disease is an infection caused by pneumococcal bacteria  The infection may cause pneumonia, meningitis, or an ear infection  · Get a shingles vaccine  if you are aged 61 or older, even if you have had shingles before  The shingles vaccine is an injection to protect you from the varicella-zoster virus  This is the same virus that causes chickenpox   Shingles is a painful rash that develops in people who had chickenpox or have been exposed to the virus  How to eat healthy:  My Plate is a model for planning healthy meals  It shows the types and amounts of foods that should go on your plate  Fruits and vegetables make up about half of your plate, and grains and protein make up the other half  A serving of dairy is included on the side of your plate  The amount of calories and serving sizes you need depends on your age, gender, weight, and height  Examples of healthy foods are listed below:  · Eat a variety of vegetables  such as dark green, red, and orange vegetables  You can also include canned vegetables low in sodium (salt) and frozen vegetables without added butter or sauces  · Eat a variety of fresh fruits , canned fruit in 100% juice, frozen fruit, and dried fruit  · Include whole grains  At least half of the grains you eat should be whole grains  Examples include whole-wheat bread, wheat pasta, brown rice, and whole-grain cereals such as oatmeal     · Eat a variety of protein foods such as seafood (fish and shellfish), lean meat, and poultry without skin (turkey and chicken)  Examples of lean meats include pork leg, shoulder, or tenderloin, and beef round, sirloin, tenderloin, and extra lean ground beef  Other protein foods include eggs and egg substitutes, beans, peas, soy products, nuts, and seeds  · Choose low-fat dairy products such as skim or 1% milk or low-fat yogurt, cheese, and cottage cheese  · Limit unhealthy fats  such as butter, hard margarine, and shortening  Exercise:  Exercise at least 30 minutes per day on most days of the week  Some examples of exercise include walking, biking, dancing, and swimming  You can also fit in more physical activity by taking the stairs instead of the elevator or parking farther away from stores  Include muscle strengthening activities 2 days each week  Regular exercise provides many health benefits   It helps you manage your weight, and decreases your risk for type 2 diabetes, heart disease, stroke, and high blood pressure  Exercise can also help improve your mood  Ask your healthcare provider about the best exercise plan for you  General health and safety guidelines:   · Do not smoke  Nicotine and other chemicals in cigarettes and cigars can cause lung damage  Ask your healthcare provider for information if you currently smoke and need help to quit  E-cigarettes or smokeless tobacco still contain nicotine  Talk to your healthcare provider before you use these products  · Limit alcohol  A drink of alcohol is 12 ounces of beer, 5 ounces of wine, or 1½ ounces of liquor  · Lose weight, if needed  Being overweight increases your risk of certain health conditions  These include heart disease, high blood pressure, type 2 diabetes, and certain types of cancer  · Protect your skin  Do not sunbathe or use tanning beds  Use sunscreen with a SPF 15 or higher  Apply sunscreen at least 15 minutes before you go outside  Reapply sunscreen every 2 hours  Wear protective clothing, hats, and sunglasses when you are outside  · Drive safely  Always wear your seatbelt  Make sure everyone in your car wears a seatbelt  A seatbelt can save your life if you are in an accident  Do not use your cell phone when you are driving  This could distract you and cause an accident  Pull over if you need to make a call or send a text message  · Practice safe sex  Use latex condoms if are sexually active and have more than one partner  Your healthcare provider may recommend screening tests for sexually transmitted infections (STIs)  · Wear helmets, lifejackets, and protective gear  Always wear a helmet when you ride a bike or motorcycle, go skiing, or play sports that could cause a head injury  Wear protective equipment when you play sports  Wear a lifejacket when you are on a boat or doing water sports    © 2017 [facility] is for End User's use only and may not be sold, redistributed or otherwise used for commercial purposes  All illustrations and images included in CareNotes® are the copyrighted property of A D A M [employer] Inc  or Josh Newberry  The above information is an  only  It is not intended as medical advice for individual conditions or treatments  Talk to your doctor, nurse or pharmacist before following any medical regimen to see if it is safe and effective for you

## 2019-02-04 LAB
ALBUMIN SERPL-MCNC: 4.8 G/DL (ref 3.6–5.1)
ALBUMIN/GLOB SERPL: 1.6 (CALC) (ref 1–2.5)
ALP SERPL-CCNC: 51 U/L (ref 33–115)
ALT SERPL-CCNC: 12 U/L (ref 6–29)
AST SERPL-CCNC: 17 U/L (ref 10–30)
BILIRUB SERPL-MCNC: 2.7 MG/DL (ref 0.2–1.2)
BUN SERPL-MCNC: 9 MG/DL (ref 7–25)
BUN/CREAT SERPL: ABNORMAL (CALC) (ref 6–22)
CALCIUM SERPL-MCNC: 9.7 MG/DL (ref 8.6–10.2)
CHLORIDE SERPL-SCNC: 104 MMOL/L (ref 98–110)
CO2 SERPL-SCNC: 24 MMOL/L (ref 20–32)
CREAT SERPL-MCNC: 0.77 MG/DL (ref 0.5–1.1)
GLOBULIN SER CALC-MCNC: 3 G/DL (CALC) (ref 1.9–3.7)
GLUCOSE SERPL-MCNC: 88 MG/DL (ref 65–99)
HAV IGM SERPL QL IA: NORMAL
HBV CORE IGM SERPL QL IA: NORMAL
HBV SURFACE AG SERPL QL IA: NORMAL
HCV AB S/CO SERPL IA: 0.77
HCV AB SERPL QL IA: NORMAL
HIV 1+2 AB+HIV1 P24 AG SERPL QL IA: NORMAL
POTASSIUM SERPL-SCNC: 4.2 MMOL/L (ref 3.5–5.3)
PROT SERPL-MCNC: 7.8 G/DL (ref 6.1–8.1)
SL AMB EGFR AFRICAN AMERICAN: 112 ML/MIN/1.73M2
SL AMB EGFR NON AFRICAN AMERICAN: 97 ML/MIN/1.73M2
SODIUM SERPL-SCNC: 139 MMOL/L (ref 135–146)
T3FREE SERPL-MCNC: 3.4 PG/ML (ref 2.3–4.2)
T4 FREE SERPL-MCNC: 1.3 NG/DL (ref 0.8–1.8)
THYROPEROXIDASE AB SERPL-ACNC: 37 IU/ML
TSH SERPL-ACNC: 1.64 MIU/L

## 2020-04-29 ENCOUNTER — TELEPHONE (OUTPATIENT)
Dept: FAMILY MEDICINE CLINIC | Facility: CLINIC | Age: 40
End: 2020-04-29

## 2020-05-08 ENCOUNTER — OFFICE VISIT (OUTPATIENT)
Dept: FAMILY MEDICINE CLINIC | Facility: CLINIC | Age: 40
End: 2020-05-08
Payer: COMMERCIAL

## 2020-05-08 ENCOUNTER — TELEPHONE (OUTPATIENT)
Dept: ADMINISTRATIVE | Facility: OTHER | Age: 40
End: 2020-05-08

## 2020-05-08 VITALS
WEIGHT: 129.75 LBS | OXYGEN SATURATION: 97 % | HEIGHT: 68 IN | BODY MASS INDEX: 19.66 KG/M2 | HEART RATE: 73 BPM | DIASTOLIC BLOOD PRESSURE: 58 MMHG | SYSTOLIC BLOOD PRESSURE: 102 MMHG | TEMPERATURE: 97.2 F

## 2020-05-08 DIAGNOSIS — R53.82 CHRONIC FATIGUE: ICD-10-CM

## 2020-05-08 DIAGNOSIS — D64.9 ANEMIA, UNSPECIFIED TYPE: ICD-10-CM

## 2020-05-08 DIAGNOSIS — Z00.00 WELL ADULT EXAM: ICD-10-CM

## 2020-05-08 DIAGNOSIS — F41.1 GENERALIZED ANXIETY DISORDER: ICD-10-CM

## 2020-05-08 DIAGNOSIS — F41.9 ANXIETY: Primary | ICD-10-CM

## 2020-05-08 DIAGNOSIS — Z13.220 SCREENING FOR LIPID DISORDERS: ICD-10-CM

## 2020-05-08 DIAGNOSIS — E06.3 HASHIMOTO'S THYROIDITIS: ICD-10-CM

## 2020-05-08 DIAGNOSIS — Z20.828 EXPOSURE TO SARS-ASSOCIATED CORONAVIRUS: ICD-10-CM

## 2020-05-08 DIAGNOSIS — E80.6 HYPERBILIRUBINEMIA: ICD-10-CM

## 2020-05-08 DIAGNOSIS — R74.01 ELEVATED ALT MEASUREMENT: ICD-10-CM

## 2020-05-08 PROBLEM — R19.4 CHANGE IN BOWEL HABITS: Status: RESOLVED | Noted: 2018-08-01 | Resolved: 2020-05-08

## 2020-05-08 PROBLEM — R10.9 LEFT LATERAL ABDOMINAL PAIN: Status: RESOLVED | Noted: 2018-06-28 | Resolved: 2020-05-08

## 2020-05-08 PROBLEM — Z77.21 EXPOSURE TO BLOOD OR BODY FLUID: Status: RESOLVED | Noted: 2019-02-01 | Resolved: 2020-05-08

## 2020-05-08 PROBLEM — R19.4 CHANGE IN BOWEL HABIT: Status: RESOLVED | Noted: 2018-08-14 | Resolved: 2020-05-08

## 2020-05-08 PROCEDURE — 99396 PREV VISIT EST AGE 40-64: CPT | Performed by: FAMILY MEDICINE

## 2020-05-08 PROCEDURE — 3008F BODY MASS INDEX DOCD: CPT | Performed by: FAMILY MEDICINE

## 2020-05-08 RX ORDER — DIPHENOXYLATE HYDROCHLORIDE AND ATROPINE SULFATE 2.5; .025 MG/1; MG/1
1 TABLET ORAL DAILY
COMMUNITY

## 2020-05-08 RX ORDER — CITALOPRAM 10 MG/1
10 TABLET ORAL DAILY
Qty: 30 TABLET | Refills: 2 | Status: SHIPPED | OUTPATIENT
Start: 2020-05-08 | End: 2020-07-27 | Stop reason: SDUPTHER

## 2020-07-27 DIAGNOSIS — F41.9 ANXIETY: ICD-10-CM

## 2020-07-28 DIAGNOSIS — F41.9 ANXIETY: ICD-10-CM

## 2020-07-28 RX ORDER — CITALOPRAM 10 MG/1
10 TABLET ORAL DAILY
Qty: 30 TABLET | Refills: 0 | Status: SHIPPED | OUTPATIENT
Start: 2020-07-28 | End: 2020-09-09 | Stop reason: SDUPTHER

## 2020-07-28 RX ORDER — CITALOPRAM 10 MG/1
10 TABLET ORAL DAILY
Qty: 30 TABLET | Refills: 0 | Status: SHIPPED | OUTPATIENT
Start: 2020-07-28 | End: 2020-07-28 | Stop reason: SDUPTHER

## 2020-08-05 ENCOUNTER — TELEPHONE (OUTPATIENT)
Dept: FAMILY MEDICINE CLINIC | Facility: CLINIC | Age: 40
End: 2020-08-05

## 2020-09-09 DIAGNOSIS — F41.9 ANXIETY: ICD-10-CM

## 2020-09-09 RX ORDER — CITALOPRAM 10 MG/1
10 TABLET ORAL DAILY
Qty: 30 TABLET | Refills: 0 | Status: SHIPPED | OUTPATIENT
Start: 2020-09-09 | End: 2020-10-12 | Stop reason: SDUPTHER

## 2020-10-12 DIAGNOSIS — F41.9 ANXIETY: ICD-10-CM

## 2020-10-12 RX ORDER — CITALOPRAM 10 MG/1
10 TABLET ORAL DAILY
Qty: 30 TABLET | Refills: 2 | Status: SHIPPED | OUTPATIENT
Start: 2020-10-12 | End: 2020-11-07 | Stop reason: SDUPTHER

## 2020-11-07 DIAGNOSIS — F41.9 ANXIETY: ICD-10-CM

## 2020-11-09 RX ORDER — CITALOPRAM 10 MG/1
10 TABLET ORAL DAILY
Qty: 30 TABLET | Refills: 0 | Status: SHIPPED | OUTPATIENT
Start: 2020-11-09 | End: 2020-12-10 | Stop reason: SDUPTHER

## 2020-12-04 DIAGNOSIS — F41.9 ANXIETY: ICD-10-CM

## 2020-12-04 RX ORDER — CITALOPRAM 10 MG/1
10 TABLET ORAL DAILY
Qty: 30 TABLET | Refills: 0 | OUTPATIENT
Start: 2020-12-04

## 2020-12-04 RX ORDER — CITALOPRAM 10 MG/1
TABLET ORAL
Qty: 30 TABLET | Refills: 0 | OUTPATIENT
Start: 2020-12-04

## 2020-12-10 DIAGNOSIS — F41.9 ANXIETY: ICD-10-CM

## 2020-12-11 RX ORDER — CITALOPRAM 10 MG/1
10 TABLET ORAL DAILY
Qty: 30 TABLET | Refills: 0 | Status: SHIPPED | OUTPATIENT
Start: 2020-12-11 | End: 2021-01-04 | Stop reason: SDUPTHER

## 2020-12-14 ENCOUNTER — TELEMEDICINE (OUTPATIENT)
Dept: FAMILY MEDICINE CLINIC | Facility: CLINIC | Age: 40
End: 2020-12-14
Payer: COMMERCIAL

## 2020-12-14 VITALS — BODY MASS INDEX: 20.46 KG/M2 | WEIGHT: 135 LBS | HEIGHT: 68 IN

## 2020-12-14 DIAGNOSIS — F41.1 GENERALIZED ANXIETY DISORDER: Primary | ICD-10-CM

## 2020-12-14 PROBLEM — Z00.00 WELL ADULT EXAM: Status: RESOLVED | Noted: 2019-02-01 | Resolved: 2020-12-14

## 2020-12-14 PROCEDURE — 99213 OFFICE O/P EST LOW 20 MIN: CPT | Performed by: FAMILY MEDICINE

## 2020-12-14 PROCEDURE — 3725F SCREEN DEPRESSION PERFORMED: CPT | Performed by: FAMILY MEDICINE

## 2020-12-14 PROCEDURE — 1036F TOBACCO NON-USER: CPT | Performed by: FAMILY MEDICINE

## 2020-12-14 PROCEDURE — 3008F BODY MASS INDEX DOCD: CPT | Performed by: FAMILY MEDICINE

## 2021-01-03 DIAGNOSIS — F41.9 ANXIETY: ICD-10-CM

## 2021-01-04 DIAGNOSIS — F41.9 ANXIETY: ICD-10-CM

## 2021-01-04 RX ORDER — CITALOPRAM 10 MG/1
TABLET ORAL
Qty: 30 TABLET | Refills: 0 | Status: SHIPPED | OUTPATIENT
Start: 2021-01-04 | End: 2021-03-04

## 2021-01-04 RX ORDER — CITALOPRAM 10 MG/1
10 TABLET ORAL DAILY
Qty: 30 TABLET | Refills: 0 | Status: SHIPPED | OUTPATIENT
Start: 2021-01-04 | End: 2021-02-03

## 2021-02-02 DIAGNOSIS — F41.9 ANXIETY: ICD-10-CM

## 2021-02-03 RX ORDER — CITALOPRAM 10 MG/1
TABLET ORAL
Qty: 30 TABLET | Refills: 0 | Status: SHIPPED | OUTPATIENT
Start: 2021-02-03 | End: 2021-07-02 | Stop reason: ALTCHOICE

## 2021-02-16 ENCOUNTER — TRANSCRIBE ORDERS (OUTPATIENT)
Dept: ADMINISTRATIVE | Facility: HOSPITAL | Age: 41
End: 2021-02-16

## 2021-02-16 DIAGNOSIS — Z12.31 ENCOUNTER FOR SCREENING MAMMOGRAM FOR MALIGNANT NEOPLASM OF BREAST: Primary | ICD-10-CM

## 2021-02-17 ENCOUNTER — HOSPITAL ENCOUNTER (OUTPATIENT)
Dept: MAMMOGRAPHY | Facility: IMAGING CENTER | Age: 41
Discharge: HOME | End: 2021-02-17
Payer: COMMERCIAL

## 2021-02-17 VITALS — BODY MASS INDEX: 21.22 KG/M2 | HEIGHT: 68 IN | WEIGHT: 140 LBS

## 2021-02-17 DIAGNOSIS — Z12.31 ENCOUNTER FOR SCREENING MAMMOGRAM FOR MALIGNANT NEOPLASM OF BREAST: ICD-10-CM

## 2021-02-17 PROCEDURE — 77063 BREAST TOMOSYNTHESIS BI: CPT

## 2021-02-17 PROCEDURE — 77067 SCR MAMMO BI INCL CAD: CPT

## 2021-02-26 ENCOUNTER — HOSPITAL ENCOUNTER (OUTPATIENT)
Dept: ULTRASOUND IMAGING | Facility: CLINIC | Age: 41
Discharge: HOME | End: 2021-02-26
Payer: COMMERCIAL

## 2021-02-26 ENCOUNTER — HOSPITAL ENCOUNTER (OUTPATIENT)
Dept: MAMMOGRAPHY | Facility: CLINIC | Age: 41
Discharge: HOME | End: 2021-02-26
Payer: COMMERCIAL

## 2021-02-26 VITALS — HEIGHT: 68 IN | WEIGHT: 140 LBS | BODY MASS INDEX: 21.22 KG/M2

## 2021-02-26 DIAGNOSIS — R92.8 ABNORMAL SCREENING MAMMOGRAM: ICD-10-CM

## 2021-02-26 PROCEDURE — 76642 ULTRASOUND BREAST LIMITED: CPT

## 2021-02-26 PROCEDURE — 77065 DX MAMMO INCL CAD UNI: CPT

## 2021-02-26 PROCEDURE — G0279 TOMOSYNTHESIS, MAMMO: HCPCS

## 2021-03-04 DIAGNOSIS — F41.9 ANXIETY: ICD-10-CM

## 2021-03-04 RX ORDER — CITALOPRAM 10 MG/1
TABLET ORAL
Qty: 30 TABLET | Refills: 0 | Status: SHIPPED | OUTPATIENT
Start: 2021-03-04 | End: 2021-04-03

## 2021-03-30 ENCOUNTER — TELEMEDICINE (OUTPATIENT)
Dept: FAMILY MEDICINE CLINIC | Facility: CLINIC | Age: 41
End: 2021-03-30
Payer: COMMERCIAL

## 2021-03-30 VITALS — HEIGHT: 68 IN | BODY MASS INDEX: 21.98 KG/M2 | WEIGHT: 145 LBS | TEMPERATURE: 97.2 F

## 2021-03-30 DIAGNOSIS — U07.1 COVID-19 VIRUS INFECTION: Primary | ICD-10-CM

## 2021-03-30 DIAGNOSIS — R05.3 PERSISTENT COUGH: ICD-10-CM

## 2021-03-30 PROCEDURE — 99213 OFFICE O/P EST LOW 20 MIN: CPT | Performed by: FAMILY MEDICINE

## 2021-03-30 RX ORDER — BENZONATATE 100 MG/1
100 CAPSULE ORAL 3 TIMES DAILY PRN
Qty: 20 CAPSULE | Refills: 0 | Status: SHIPPED | OUTPATIENT
Start: 2021-03-30 | End: 2021-10-25 | Stop reason: ALTCHOICE

## 2021-04-03 DIAGNOSIS — F41.9 ANXIETY: ICD-10-CM

## 2021-04-03 RX ORDER — CITALOPRAM 10 MG/1
TABLET ORAL
Qty: 30 TABLET | Refills: 0 | Status: SHIPPED | OUTPATIENT
Start: 2021-04-03 | End: 2021-05-03

## 2021-04-05 PROBLEM — U07.1 COVID-19 VIRUS INFECTION: Status: ACTIVE | Noted: 2021-04-05

## 2021-04-05 PROBLEM — R05.3 PERSISTENT COUGH: Status: ACTIVE | Noted: 2021-04-05

## 2021-04-05 NOTE — ASSESSMENT & PLAN NOTE
Due to covid 19, watch for shortness of breath  Tessalon pearles 1 tab 3 times a day as needed  Able to sleep at night

## 2021-04-05 NOTE — PROGRESS NOTES
Virtual Regular Visit      Assessment/Plan:    Problem List Items Addressed This Visit        Other    COVID-19 virus infection - Primary     Monitor breathing, recommend pulse ox, call if below 90  Relevant Medications    benzonatate (TESSALON PERLES) 100 mg capsule    Persistent cough     Due to covid 19, watch for shortness of breath  Tessalon pearles 1 tab 3 times a day as needed  Able to sleep at night  Reason for visit is   Chief Complaint   Patient presents with    Cough     pt tested positive for covid, pt states is having a bad cough, fatigue loss of taste  no fever   Virtual Regular Visit        Encounter provider Wilver Alves DO    Provider located at [address] [place] [phone]      Recent Visits  Date Type Provider Dept   03/30/21 Telemedicine Wilver Alves DO [facility]   Showing recent visits within past 7 days and meeting all other requirements     Future Appointments  No visits were found meeting these conditions  Showing future appointments within next 150 days and meeting all other requirements        The patient was identified by name and date of birth  Irish Can was informed that this is a telemedicine visit and that the visit is being conducted through [facility] and patient was informed that this is a secure, HIPAA-compliant platform  She agrees to proceed     My office door was closed  No one else was in the room  She acknowledged consent and understanding of privacy and security of the video platform  The patient has agreed to participate and understands they can discontinue the visit at any time  Patient is aware this is a billable service  Subjective  Irish Can is a 35 y o  female complains of ongoing cough, day and night  Pt diagnosed with covid   Pt diagnosed with covid and is in quarantine for 10 days and complains of cough  No fever  Did lose sense of taste   No shortness of breath with activity or at rest         Past Medical History:   Diagnosis Date    Anxiety     Depression     Disease of thyroid gland     Hashimoto's    Gall stones     Gestational diabetes mellitus, class A1     Last Assessed: 04 Feb 2016    History of HPV infection     History of posttraumatic stress disorder (PTSD)     Migraine     Last Assessed: 22 Jan 9409    Umbilical hernia        Past Surgical History:   Procedure Laterality Date    CERVICAL CONIZATION   W/ LASER      CHOLECYSTECTOMY      EGD AND COLONOSCOPY N/A 9/25/2018    Procedure: EGD AND COLONOSCOPY;  Surgeon: Edgar Crenshaw MD;  Location: [facility];   Service: Gastroenterology    HERNIA REPAIR      MULTIPLE TOOTH EXTRACTIONS      SD LAP,CHOLECYSTECTOMY N/A 1/25/2017    Procedure: Collins Bue;  Surgeon: Crescencio Man MD;  [facility];  Service: General    SD LAP,CHOLECYSTECTOMY/GRAPH N/A 1/25/2017    Procedure: CHOLANGIOGRAM;  Surgeon: Crescencio Man MD;  [facility];  Service: General    SD REPAIR UMBILICAL AKMT,8+K/Z,XFFAI N/A 1/25/2017    Procedure: OPEN UMBILICAL HERNIA REPAIR ;  Surgeon: Crescencio Man MD;  [facility];  Service: General    VAGINAL DELIVERY      X 2       Current Outpatient Medications   Medication Sig Dispense Refill    acetaminophen (TYLENOL) 500 mg tablet Take 500 mg by mouth every 6 (six) hours as needed for mild pain      Ascorbic Acid (VITAMIN C PO) Take by mouth      citalopram (CeleXA) 10 mg tablet TAKE ONE TABLET BY MOUTH EVERY DAY 30 tablet 0    hydrocortisone 2 5 % lotion Apply topically      LORazepam (ATIVAN) 0 5 mg tablet Take by mouth      multivitamin (THERAGRAN) TABS Take 1 tablet by mouth daily      Turmeric 500 MG TABS Take by mouth      Zinc 50 MG TABS Take by mouth      benzonatate (TESSALON PERLES) 100 mg capsule Take 1 capsule (100 mg total) by mouth 3 (three) times a day as needed for cough 20 capsule 0    citalopram (CeleXA) 10 mg tablet TAKE ONE TABLET BY MOUTH EVERY DAY 30 tablet 0     No current facility-administered medications for this visit  Allergies   Allergen Reactions    Septra [Sulfamethoxazole-Trimethoprim] Rash    Sulfa Antibiotics Rash     Category: Allergy; Review of Systems   Constitutional: Positive for fatigue  Negative for fever  HENT: Negative  Eyes: Negative  Respiratory: Positive for cough  Negative for shortness of breath  Cardiovascular: Negative  Gastrointestinal: Negative  Endocrine: Negative  Genitourinary: Negative  Musculoskeletal: Negative  Skin: Negative  Allergic/Immunologic: Negative  Neurological: Negative  Psychiatric/Behavioral: Negative  Video Exam    Vitals:    03/30/21 1656   Temp: (!) 97 2 °F (36 2 °C)   Weight: 65 8 kg (145 lb)   Height: 5' 7 76" (1 721 m)       Physical Exam  Vitals signs and nursing note reviewed  Constitutional:       Appearance: She is well-developed  Eyes:      Conjunctiva/sclera: Conjunctivae normal    Pulmonary:      Effort: Pulmonary effort is normal    Neurological:      Mental Status: She is alert and oriented to person, place, and time  Psychiatric:         Behavior: Behavior normal          Thought Content: Thought content normal          Judgment: Judgment normal           I spent 15 minutes directly with the patient during this visit      VIRTUAL VISIT DISCLAIMER    Alfie Yates acknowledges that she has consented to an online visit or consultation  She understands that the online visit is based solely on information provided by her, and that, in the absence of a face-to-face physical evaluation by the physician, the diagnosis she receives is both limited and provisional in terms of accuracy and completeness  This is not intended to replace a full medical face-to-face evaluation by the physician  Alfie Yates understands and accepts these terms

## 2021-04-05 NOTE — PATIENT INSTRUCTIONS
Tessalon pearles 1 tab 3 times a day as needed for cough  Pulse oximeter to monitor oxygen levels above 90  Rest, continue with quarantine for 10 days     Call if other problems develop

## 2021-05-03 DIAGNOSIS — F41.9 ANXIETY: ICD-10-CM

## 2021-05-03 RX ORDER — CITALOPRAM 10 MG/1
10 TABLET ORAL DAILY
Qty: 30 TABLET | Refills: 0 | Status: SHIPPED | OUTPATIENT
Start: 2021-05-03 | End: 2021-06-02

## 2021-06-02 DIAGNOSIS — F41.9 ANXIETY: ICD-10-CM

## 2021-06-02 RX ORDER — CITALOPRAM 10 MG/1
10 TABLET ORAL DAILY
Qty: 30 TABLET | Refills: 0 | Status: SHIPPED | OUTPATIENT
Start: 2021-06-02 | End: 2021-07-02 | Stop reason: ALTCHOICE

## 2021-07-02 DIAGNOSIS — F34.1 DYSTHYMIA: Primary | ICD-10-CM

## 2021-07-02 RX ORDER — CITALOPRAM 10 MG/1
TABLET ORAL
Qty: 30 TABLET | Refills: 0 | Status: SHIPPED | OUTPATIENT
Start: 2021-07-02 | End: 2021-07-14 | Stop reason: SDUPTHER

## 2021-08-28 DIAGNOSIS — F34.1 DYSTHYMIA: ICD-10-CM

## 2021-08-30 RX ORDER — CITALOPRAM 10 MG/1
TABLET ORAL
Qty: 30 TABLET | Refills: 0 | Status: SHIPPED | OUTPATIENT
Start: 2021-08-30 | End: 2021-10-01

## 2021-09-27 DIAGNOSIS — F34.1 DYSTHYMIA: ICD-10-CM

## 2021-10-01 RX ORDER — CITALOPRAM 10 MG/1
TABLET ORAL
Qty: 30 TABLET | Refills: 0 | Status: SHIPPED | OUTPATIENT
Start: 2021-10-01 | End: 2021-10-15

## 2021-10-11 ENCOUNTER — OFFICE VISIT (OUTPATIENT)
Dept: OBGYN CLINIC | Facility: CLINIC | Age: 41
End: 2021-10-11
Payer: COMMERCIAL

## 2021-10-11 VITALS
HEIGHT: 68 IN | SYSTOLIC BLOOD PRESSURE: 104 MMHG | BODY MASS INDEX: 20.46 KG/M2 | WEIGHT: 135 LBS | DIASTOLIC BLOOD PRESSURE: 64 MMHG

## 2021-10-11 DIAGNOSIS — Z11.3 SCREENING EXAMINATION FOR STD (SEXUALLY TRANSMITTED DISEASE): ICD-10-CM

## 2021-10-11 DIAGNOSIS — N39.3 SUI (STRESS URINARY INCONTINENCE, FEMALE): Primary | ICD-10-CM

## 2021-10-11 PROCEDURE — 99213 OFFICE O/P EST LOW 20 MIN: CPT | Performed by: OBSTETRICS & GYNECOLOGY

## 2021-10-12 LAB
APPEARANCE UR: CLEAR
BACTERIA URNS QL MICRO: NORMAL
BILIRUB UR QL STRIP: NEGATIVE
CASTS URNS QL MICRO: NORMAL /LPF
COLOR UR: YELLOW
EPI CELLS #/AREA URNS HPF: NORMAL /HPF (ref 0–10)
GLUCOSE UR QL: NEGATIVE
HGB UR QL STRIP: NEGATIVE
KETONES UR QL STRIP: NEGATIVE
LEUKOCYTE ESTERASE UR QL STRIP: NEGATIVE
MICRO URNS: NORMAL
MICRO URNS: NORMAL
NITRITE UR QL STRIP: NEGATIVE
PH UR STRIP: 7 [PH] (ref 5–7.5)
PROT UR QL STRIP: NEGATIVE
RBC #/AREA URNS HPF: NORMAL /HPF (ref 0–2)
SL AMB URINALYSIS REFLEX: NORMAL
SP GR UR: 1.02 (ref 1–1.03)
UROBILINOGEN UR STRIP-ACNC: 1 MG/DL (ref 0.2–1)
WBC #/AREA URNS HPF: NORMAL /HPF (ref 0–5)

## 2021-10-13 LAB
C TRACH RRNA SPEC QL NAA+PROBE: NEGATIVE
N GONORRHOEA RRNA SPEC QL NAA+PROBE: NEGATIVE

## 2021-10-20 DIAGNOSIS — F34.1 DYSTHYMIA: ICD-10-CM

## 2021-10-20 RX ORDER — CITALOPRAM 10 MG/1
TABLET ORAL
Qty: 30 TABLET | Refills: 0 | Status: SHIPPED | OUTPATIENT
Start: 2021-10-20 | End: 2021-10-25 | Stop reason: SDUPTHER

## 2021-10-22 ENCOUNTER — EVALUATION (OUTPATIENT)
Dept: PHYSICAL THERAPY | Facility: CLINIC | Age: 41
End: 2021-10-22
Payer: COMMERCIAL

## 2021-10-22 DIAGNOSIS — N39.3 SUI (STRESS URINARY INCONTINENCE, FEMALE): ICD-10-CM

## 2021-10-22 PROCEDURE — 97112 NEUROMUSCULAR REEDUCATION: CPT | Performed by: PHYSICAL THERAPIST

## 2021-10-22 PROCEDURE — 97162 PT EVAL MOD COMPLEX 30 MIN: CPT | Performed by: PHYSICAL THERAPIST

## 2021-10-25 ENCOUNTER — OFFICE VISIT (OUTPATIENT)
Dept: FAMILY MEDICINE CLINIC | Facility: CLINIC | Age: 41
End: 2021-10-25
Payer: COMMERCIAL

## 2021-10-25 VITALS
HEART RATE: 87 BPM | WEIGHT: 136 LBS | DIASTOLIC BLOOD PRESSURE: 70 MMHG | SYSTOLIC BLOOD PRESSURE: 120 MMHG | BODY MASS INDEX: 20.61 KG/M2 | HEIGHT: 68 IN | OXYGEN SATURATION: 98 % | TEMPERATURE: 97.6 F

## 2021-10-25 DIAGNOSIS — D64.9 ANEMIA, UNSPECIFIED TYPE: ICD-10-CM

## 2021-10-25 DIAGNOSIS — E06.3 HASHIMOTO'S THYROIDITIS: ICD-10-CM

## 2021-10-25 DIAGNOSIS — F34.1 DYSTHYMIA: ICD-10-CM

## 2021-10-25 DIAGNOSIS — R92.8 ABNORMAL MAMMOGRAM OF LEFT BREAST: ICD-10-CM

## 2021-10-25 DIAGNOSIS — R53.82 CHRONIC FATIGUE: ICD-10-CM

## 2021-10-25 DIAGNOSIS — R74.01 ELEVATED ALT MEASUREMENT: ICD-10-CM

## 2021-10-25 DIAGNOSIS — N92.1 METRORRHAGIA: ICD-10-CM

## 2021-10-25 DIAGNOSIS — E80.6 HYPERBILIRUBINEMIA: ICD-10-CM

## 2021-10-25 DIAGNOSIS — Z00.00 WELL ADULT EXAM: Primary | ICD-10-CM

## 2021-10-25 PROCEDURE — 1036F TOBACCO NON-USER: CPT | Performed by: FAMILY MEDICINE

## 2021-10-25 PROCEDURE — 99396 PREV VISIT EST AGE 40-64: CPT | Performed by: FAMILY MEDICINE

## 2021-10-25 PROCEDURE — 3008F BODY MASS INDEX DOCD: CPT | Performed by: FAMILY MEDICINE

## 2021-10-25 RX ORDER — CITALOPRAM 10 MG/1
10 TABLET ORAL DAILY
Qty: 90 TABLET | Refills: 3 | Status: SHIPPED | OUTPATIENT
Start: 2021-10-25 | End: 2022-04-21 | Stop reason: SDUPTHER

## 2021-10-26 PROBLEM — R92.8 ABNORMAL MAMMOGRAM OF LEFT BREAST: Status: ACTIVE | Noted: 2021-10-26

## 2021-10-29 ENCOUNTER — OFFICE VISIT (OUTPATIENT)
Dept: PHYSICAL THERAPY | Facility: CLINIC | Age: 41
End: 2021-10-29
Payer: COMMERCIAL

## 2021-10-29 DIAGNOSIS — N39.3 SUI (STRESS URINARY INCONTINENCE, FEMALE): Primary | ICD-10-CM

## 2021-10-29 PROCEDURE — 97110 THERAPEUTIC EXERCISES: CPT | Performed by: PHYSICAL THERAPIST

## 2021-10-29 PROCEDURE — 97530 THERAPEUTIC ACTIVITIES: CPT | Performed by: PHYSICAL THERAPIST

## 2021-10-29 PROCEDURE — 97112 NEUROMUSCULAR REEDUCATION: CPT | Performed by: PHYSICAL THERAPIST

## 2021-11-03 ENCOUNTER — OFFICE VISIT (OUTPATIENT)
Dept: PHYSICAL THERAPY | Facility: CLINIC | Age: 41
End: 2021-11-03
Payer: COMMERCIAL

## 2021-11-03 DIAGNOSIS — N39.3 SUI (STRESS URINARY INCONTINENCE, FEMALE): Primary | ICD-10-CM

## 2021-11-03 PROCEDURE — 97530 THERAPEUTIC ACTIVITIES: CPT | Performed by: PHYSICAL THERAPIST

## 2021-11-03 PROCEDURE — 97110 THERAPEUTIC EXERCISES: CPT | Performed by: PHYSICAL THERAPIST

## 2021-11-03 PROCEDURE — 97112 NEUROMUSCULAR REEDUCATION: CPT | Performed by: PHYSICAL THERAPIST

## 2021-11-16 ENCOUNTER — APPOINTMENT (OUTPATIENT)
Dept: PHYSICAL THERAPY | Facility: CLINIC | Age: 41
End: 2021-11-16
Payer: COMMERCIAL

## 2021-11-17 ENCOUNTER — OFFICE VISIT (OUTPATIENT)
Dept: PHYSICAL THERAPY | Facility: CLINIC | Age: 41
End: 2021-11-17
Payer: COMMERCIAL

## 2021-11-17 DIAGNOSIS — N39.3 SUI (STRESS URINARY INCONTINENCE, FEMALE): Primary | ICD-10-CM

## 2021-11-17 PROCEDURE — 97110 THERAPEUTIC EXERCISES: CPT | Performed by: PHYSICAL THERAPIST

## 2021-11-17 PROCEDURE — 97140 MANUAL THERAPY 1/> REGIONS: CPT | Performed by: PHYSICAL THERAPIST

## 2021-11-17 PROCEDURE — 97112 NEUROMUSCULAR REEDUCATION: CPT | Performed by: PHYSICAL THERAPIST

## 2021-11-29 ENCOUNTER — APPOINTMENT (OUTPATIENT)
Dept: PHYSICAL THERAPY | Facility: CLINIC | Age: 41
End: 2021-11-29
Payer: COMMERCIAL

## 2021-11-30 ENCOUNTER — APPOINTMENT (OUTPATIENT)
Dept: PHYSICAL THERAPY | Facility: CLINIC | Age: 41
End: 2021-11-30
Payer: COMMERCIAL

## 2021-12-14 ENCOUNTER — ANNUAL EXAM (OUTPATIENT)
Dept: OBGYN CLINIC | Facility: CLINIC | Age: 41
End: 2021-12-14
Payer: COMMERCIAL

## 2021-12-14 VITALS
HEIGHT: 68 IN | BODY MASS INDEX: 20.92 KG/M2 | SYSTOLIC BLOOD PRESSURE: 104 MMHG | WEIGHT: 138 LBS | DIASTOLIC BLOOD PRESSURE: 64 MMHG

## 2021-12-14 DIAGNOSIS — N39.3 SUI (STRESS URINARY INCONTINENCE, FEMALE): ICD-10-CM

## 2021-12-14 DIAGNOSIS — Z80.3 FH: BREAST CANCER: ICD-10-CM

## 2021-12-14 DIAGNOSIS — Z80.0 FH: COLON CANCER: ICD-10-CM

## 2021-12-14 DIAGNOSIS — Z12.31 ENCOUNTER FOR SCREENING MAMMOGRAM FOR MALIGNANT NEOPLASM OF BREAST: ICD-10-CM

## 2021-12-14 DIAGNOSIS — Z01.419 ENCOUNTER FOR GYNECOLOGICAL EXAMINATION WITHOUT ABNORMAL FINDING: Primary | ICD-10-CM

## 2021-12-14 DIAGNOSIS — Z11.3 SCREEN FOR STD (SEXUALLY TRANSMITTED DISEASE): ICD-10-CM

## 2021-12-14 PROCEDURE — S0612 ANNUAL GYNECOLOGICAL EXAMINA: HCPCS | Performed by: NURSE PRACTITIONER

## 2021-12-17 LAB
C TRACH RRNA SPEC QL NAA+PROBE: NEGATIVE
N GONORRHOEA RRNA SPEC QL NAA+PROBE: NEGATIVE

## 2022-04-21 ENCOUNTER — TELEMEDICINE (OUTPATIENT)
Dept: FAMILY MEDICINE CLINIC | Facility: CLINIC | Age: 42
End: 2022-04-21
Payer: COMMERCIAL

## 2022-04-21 DIAGNOSIS — F41.1 GENERALIZED ANXIETY DISORDER: Primary | ICD-10-CM

## 2022-04-21 DIAGNOSIS — F34.1 DYSTHYMIA: ICD-10-CM

## 2022-04-21 PROBLEM — Z00.00 WELL ADULT EXAM: Status: RESOLVED | Noted: 2019-02-01 | Resolved: 2022-04-21

## 2022-04-21 PROCEDURE — 99213 OFFICE O/P EST LOW 20 MIN: CPT | Performed by: FAMILY MEDICINE

## 2022-04-21 RX ORDER — CITALOPRAM 20 MG/1
20 TABLET ORAL DAILY
Qty: 90 TABLET | Refills: 0 | Status: SHIPPED | OUTPATIENT
Start: 2022-04-21 | End: 2022-07-30 | Stop reason: SDUPTHER

## 2022-04-21 NOTE — PROGRESS NOTES
Virtual Regular Visit    Verification of patient location:    Patient is located in the following state in which I hold an active license [place]      Assessment/Plan:    Problem List Items Addressed This Visit        Other    Generalized anxiety disorder - Primary     Increase citalopram to 20mg daily          Relevant Medications    citalopram (CeleXA) 20 mg tablet    Depression     Increase citalopram to 20mg daily   Continue with meditation, increase exercise          Relevant Medications    citalopram (CeleXA) 20 mg tablet               Reason for visit is   Chief Complaint   Patient presents with    Follow-up     talk about her depression celexa med, feel like she need a higher dose     Virtual Regular Visit        Encounter provider Alfonzo Michel DO    Provider located at [address] [facility] [place] [address]      Recent Visits  No visits were found meeting these conditions  Showing recent visits within past 7 days and meeting all other requirements  Today's Visits  Date Type Provider Dept   04/21/22 Telemedicine Alfonzo Michel DO [facility]   Showing today's visits and meeting all other requirements  Future Appointments  No visits were found meeting these conditions  Showing future appointments within next 150 days and meeting all other requirements       The patient was identified by name and date of birth  Liu Martinez was informed that this is a telemedicine visit and that the visit is being conducted through [address] [place] [address] and patient was informed that this is a secure, HIPAA-compliant platform  She agrees to proceed     My office door was closed  No one else was in the room  She acknowledged consent and understanding of privacy and security of the video platform  The patient has agreed to participate and understands they can discontinue the visit at any time  Patient is aware this is a billable service       Subjective  Liu Martinez is a 38 y o  female increased anxiety and depressive symptoms         Pt has increased stress right now  Just got a divorce  Sleeping better now that  moved out   Tolerating citalopram 10mg daily  Would like to increase dose  No recent illnesses  Past Medical History:   Diagnosis Date    Anxiety     Depression     Disease of thyroid gland     Hashimoto's    Gall stones     Gestational diabetes mellitus, class A1     Last Assessed: 04 Feb 2016    H/O diagnostic mammography 02/26/2021    Bi-Rads 3  Probable asymmetric breast tissue in the outer left breast  Left diagnostic mammois recommended in 6 months to demonstrate stability  Left breast US 2/26/2021-Probable asymmetric breast tissue in the outer left breast  Left diagnostic mammo is recommended in 6 months to demonstrat stability    Hashimoto's disease     History of HPV infection     History of posttraumatic stress disorder (PTSD)     History of screening mammography 02/17/2021    Bi-Rads 0    Migraine     Last Assessed: 22 Jan 8441    Umbilical hernia        Past Surgical History:   Procedure Laterality Date    CERVICAL CONIZATION   W/ LASER      CHOLECYSTECTOMY      EGD AND COLONOSCOPY N/A 09/25/2018    Procedure: EGD AND COLONOSCOPY;  Surgeon: Igor Wilson MD;  Location: [facility];   Service: Gastroenterology    [address] [place] [address] (HISTORICAL)  2007    HERNIA REPAIR      MAMMO (HISTORICAL)      [facility]    MAMMO (HISTORICAL)  2021    [facility]    MULTIPLE TOOTH EXTRACTIONS      DE LAP,CHOLECYSTECTOMY N/A 01/25/2017    Procedure: CHOLECYSTECTOMY LAPAROSCOPIC;  Surgeon: Dario Henson MD;  Location:  MAIN OR;  Service: General    DE LAP,CHOLECYSTECTOMY/GRAPH N/A 01/25/2017    Procedure: CHOLANGIOGRAM;  Surgeon: Dario Henson MD;  Location: [facility];  Service: General    DE REPAIR UMBILICAL NLYW,3+Q/U,CKWAK N/A 01/25/2017    Procedure: OPEN UMBILICAL HERNIA REPAIR ;  Surgeon: Dario Henson MD;  [facility];  Service: General    TUBAL LIGATION      VAGINAL DELIVERY      X 2       Current Outpatient Medications   Medication Sig Dispense Refill    Ascorbic Acid (VITAMIN C PO) Take by mouth      citalopram (CeleXA) 20 mg tablet Take 1 tablet (20 mg total) by mouth daily 90 tablet 0    LORazepam (ATIVAN) 0 5 mg tablet Take by mouth      multivitamin (THERAGRAN) TABS Take 1 tablet by mouth daily      Turmeric 500 MG TABS Take by mouth      Zinc 50 MG TABS Take by mouth       No current facility-administered medications for this visit  Allergies   Allergen Reactions    Septra [Sulfamethoxazole-Trimethoprim] Rash    Sulfa Antibiotics Rash     Category: Allergy; Review of Systems   Constitutional: Negative  Negative for fatigue and fever  HENT: Negative  Eyes: Negative  Respiratory: Negative  Negative for cough  Cardiovascular: Negative  Gastrointestinal: Negative  Endocrine: Negative  Genitourinary: Negative  Musculoskeletal: Negative  Skin: Negative  Allergic/Immunologic: Negative  Neurological: Negative  Psychiatric/Behavioral: Positive for dysphoric mood  Negative for sleep disturbance  The patient is nervous/anxious  Video Exam    There were no vitals filed for this visit  Physical Exam  Vitals and nursing note reviewed  Constitutional:       Appearance: Normal appearance  She is well-developed  HENT:      Head: Normocephalic and atraumatic  Eyes:      Conjunctiva/sclera: Conjunctivae normal    Pulmonary:      Effort: Pulmonary effort is normal    Neurological:      Mental Status: She is alert and oriented to person, place, and time  Psychiatric:         Behavior: Behavior normal          Thought Content: Thought content normal          Judgment: Judgment normal           I spent 15 minutes directly with the patient during this visit    VIRTUAL VISIT [address] verbally agrees to participate in [employer]   Pt is aware that Virtual Care Services could be limited without vital signs or the ability to perform a full hands-on physical Ardine Laser understands she or the provider may request at any time to terminate the video visit and request the patient to seek care or treatment in person

## 2022-07-30 DIAGNOSIS — F34.1 DYSTHYMIA: ICD-10-CM

## 2022-08-01 RX ORDER — CITALOPRAM 20 MG/1
20 TABLET ORAL DAILY
Qty: 90 TABLET | Refills: 0 | Status: SHIPPED | OUTPATIENT
Start: 2022-08-01

## 2022-11-02 DIAGNOSIS — F34.1 DYSTHYMIA: ICD-10-CM

## 2022-11-02 RX ORDER — CITALOPRAM 20 MG/1
20 TABLET ORAL DAILY
Qty: 90 TABLET | Refills: 0 | Status: SHIPPED | OUTPATIENT
Start: 2022-11-02 | End: 2022-11-02 | Stop reason: SDUPTHER

## 2022-11-02 RX ORDER — CITALOPRAM 20 MG/1
30 TABLET ORAL DAILY
Qty: 135 TABLET | Refills: 0 | Status: SHIPPED | OUTPATIENT
Start: 2022-11-02

## 2022-11-02 NOTE — TELEPHONE ENCOUNTER
----- Message from Igiugigmadelyn Casillas sent at 11/2/2022  9:10 AM EDT -----  Regarding: Increase in anti depression meds beeded  Helkrista Mistry,  The 20 mg of the medication is not at a therapeutic level anymore for me  When we met, you stated that if it isn’t doing the trick, we should bump up to 30  Can you please provide a script for that?   Thanks so much  Walter Bello

## 2022-12-15 ENCOUNTER — ANNUAL EXAM (OUTPATIENT)
Dept: OBGYN CLINIC | Facility: CLINIC | Age: 42
End: 2022-12-15

## 2022-12-15 VITALS
SYSTOLIC BLOOD PRESSURE: 100 MMHG | WEIGHT: 141 LBS | HEIGHT: 67 IN | BODY MASS INDEX: 22.13 KG/M2 | DIASTOLIC BLOOD PRESSURE: 60 MMHG

## 2022-12-15 DIAGNOSIS — Z12.31 BREAST CANCER SCREENING BY MAMMOGRAM: ICD-10-CM

## 2022-12-15 DIAGNOSIS — Z01.419 ROUTINE GYNECOLOGICAL EXAMINATION: Primary | ICD-10-CM

## 2022-12-15 DIAGNOSIS — Z80.3 FAMILY HISTORY OF BREAST CANCER IN FEMALE: ICD-10-CM

## 2022-12-15 DIAGNOSIS — Z12.4 SCREENING FOR MALIGNANT NEOPLASM OF THE CERVIX: ICD-10-CM

## 2022-12-15 NOTE — PROGRESS NOTES
[id number] [facility]   [address] [place] [address], [address]    ASSESSMENT/PLAN: Miguel Hernandez is a 38 y o  [id number] who presents for annual gynecologic exam  Requesting CT/GC as well and declines bloodwork    Encounter for routine gynecologic examination  - Routine well woman exam completed today  - Cervical Cancer Screening: Current ASCCP Guidelines reviewed  Last Pap: 10/01/2019   Next Pap Due: today  - HPV Vaccination status: Not immunized  - Contraceptive counseling discussed  Current contraception: tubal  - Breast Cancer Screening: Last Mammogram 02/26/2021, ordered  - Colorectal cancer screening was not ordered  - The following were reviewed in today's visit: breast self exam, mammography screening ordered and menopause    Additional problems addressed during this visit:  1  Routine gynecological examination    2  Family history of breast cancer in female  -     Mammo screening bilateral w 3d & cad; Future    3  Breast cancer screening by mammogram  -     Mammo screening bilateral w 3d & cad; Future    4  Screening for malignant neoplasm of the cervix  -     IGP,CtNg,AptimaHPV,rfx16/18,45        CC:  Annual Gynecologic Examination    HPI: Miguel Hernandez is a 38 y o  [id number] who presents for annual gynecologic examination  HPI    The following portions of the patient's history were reviewed and updated as appropriate: She  has a past medical history of Anxiety, Depression, Disease of thyroid gland, Gall stones, Gestational diabetes mellitus, class A1, H/O diagnostic mammography (02/26/2021), Hashimoto's disease, History of HPV infection, History of posttraumatic stress disorder (PTSD), History of screening mammography (02/17/2021), Migraine, Miscarriage, and Umbilical hernia    She  has a past surgical history that includes Cervical conization w/ laser; Multiple tooth extractions; Vaginal delivery; pr laparoscopy surg cholecystectomy (N/A, 82/55/8523); pr rpr umbilical hrna 5 yrs/> reducible (N/A, 01/25/2017); pr laps surg cholecystectomy w/cholangiography (N/A, 01/25/2017); Cholecystectomy; Hernia repair; EGD AND COLONOSCOPY (N/A, 09/25/2018); Mammo (historical); [facility] Biopsy/LEEP (Historical) (2007); Tubal ligation; and Mammo (historical) (2021)  Her family history includes Breast cancer in her paternal grandmother; Colon cancer in her paternal grandfather; Heart murmur in her father; No Known Problems in her daughter, maternal aunt, maternal aunt, maternal grandmother, and sister; Skin cancer in her paternal grandmother; Stroke in her maternal grandfather; Thyroid disease in her mother  She  reports that she has quit smoking  She has never used smokeless tobacco  She reports current alcohol use of about 10 0 standard drinks per week  She reports current drug use  Drug: Marijuana  Current Outpatient Medications   Medication Sig Dispense Refill   • citalopram (CeleXA) 20 mg tablet Take 1 5 tablets (30 mg total) by mouth daily 135 tablet 0   • LORazepam (ATIVAN) 0 5 mg tablet Take by mouth     • multivitamin (THERAGRAN) TABS Take 1 tablet by mouth daily     • Zinc 50 MG TABS Take by mouth       No current facility-administered medications for this visit  She is allergic to septra [sulfamethoxazole-trimethoprim] and sulfa antibiotics       Review of Systems      Objective:  /60 (BP Location: Left arm, Patient Position: Sitting, Cuff Size: Standard)   Ht 5' 6 75" (1 695 m)   Wt 64 kg (141 lb)   LMP 11/28/2022 (Exact Date)   Breastfeeding No   BMI 22 25 kg/m²    Physical Exam      PE:  General Appearance: alert and oriented, in no acute distress  HEENT: PERRL, thyroid without masses or tenderness  Breast: No masses, tenderness, skin changes, nipple D/C or axillary or supraclavicular adenopathy  Abdomen: Soft, non-tender, non-distended, no masses, no rebound or guarding  Pelvic:       External genitalia: Normal appearance, no abnormal pigmentation, no lesions or masses   Normal Bartholin's and Ardmore's  Urinary system: Urethral meatus normal, bladder non-tender  Vaginal: normal mucosa without prolapse or lesions  Normal-appearing physiologic discharge      Cervix: Normal-appearing, well-epithelialized, no gross lesions or masses No cervical motion tenderness  Adnexa: No adnexal masses or tenderness noted  Uterus: Normal-sized, regular contour, midline, mobile, no uterine tenderness  Extremities: Normal range of motion     Skin: normal, no rash or abnormalities  Neurologic: alert, oriented x3  Psychiatric: Appropriate affect, mood stable, cooperative with exam

## 2022-12-28 LAB
C TRACH RRNA CVX QL NAA+PROBE: NEGATIVE
CYTOLOGIST CVX/VAG CYTO: ABNORMAL
DX ICD CODE: ABNORMAL
HPV GENOTYPE REFLEX: ABNORMAL
HPV I/H RISK 4 DNA CVX QL PROBE+SIG AMP: POSITIVE
HPV16 DNA CVX QL PROBE+SIG AMP: NEGATIVE
HPV18+45 E6+E7 MRNA CVX QL NAA+PROBE: NEGATIVE
N GONORRHOEA RRNA CVX QL NAA+PROBE: NEGATIVE
OTHER STN SPEC: ABNORMAL
PATH REPORT.FINAL DX SPEC: ABNORMAL
SL AMB NOTE:: ABNORMAL
SL AMB SPECIMEN ADEQUACY: ABNORMAL
SL AMB TEST METHODOLOGY: ABNORMAL

## 2023-02-16 DIAGNOSIS — F34.1 DYSTHYMIA: ICD-10-CM

## 2023-02-16 RX ORDER — CITALOPRAM 20 MG/1
30 TABLET ORAL DAILY
Qty: 135 TABLET | Refills: 0 | Status: SHIPPED | OUTPATIENT
Start: 2023-02-16

## 2023-02-27 ENCOUNTER — OFFICE VISIT (OUTPATIENT)
Dept: FAMILY MEDICINE CLINIC | Facility: CLINIC | Age: 43
End: 2023-02-27

## 2023-02-27 VITALS
HEART RATE: 51 BPM | SYSTOLIC BLOOD PRESSURE: 118 MMHG | OXYGEN SATURATION: 99 % | WEIGHT: 137 LBS | TEMPERATURE: 97.5 F | BODY MASS INDEX: 21.62 KG/M2 | DIASTOLIC BLOOD PRESSURE: 64 MMHG

## 2023-02-27 DIAGNOSIS — Z13.29 SCREENING FOR THYROID DISORDER: ICD-10-CM

## 2023-02-27 DIAGNOSIS — Z13.220 SCREENING, LIPID: ICD-10-CM

## 2023-02-27 DIAGNOSIS — E06.3 HASHIMOTO'S THYROIDITIS: Primary | ICD-10-CM

## 2023-02-27 DIAGNOSIS — Z13.0 SCREENING, ANEMIA, DEFICIENCY, IRON: ICD-10-CM

## 2023-02-27 DIAGNOSIS — R87.618 PAP SMEAR ABNORMALITY OF CERVIX/HUMAN PAPILLOMAVIRUS (HPV) POSITIVE: ICD-10-CM

## 2023-02-27 DIAGNOSIS — Z13.29 SCREENING FOR ENDOCRINE DISORDER: ICD-10-CM

## 2023-02-27 NOTE — PROGRESS NOTES
Assessment/Plan:      1  Hashimoto's thyroiditis  -     Anti-microsomal antibody; Future  -     T3, free; Future  -     T4, free; Future  -     Anti-microsomal antibody  -     T3, free  -     T4, free    2  Pap smear abnormality of cervix/human papillomavirus (HPV) positive  Assessment & Plan:  Seen by gyn, negative for 16 and 18  Will follow up with gyn for repeat in 1 year  3  Screening for thyroid disorder  -     TSH, 3rd generation with Free T4 reflex  -     Anti-microsomal antibody; Future  -     Anti-microsomal antibody    4  Screening, lipid  -     Lipid Panel with Direct LDL reflex    5  Screening for endocrine disorder  -     CBC and differential  -     Comprehensive metabolic panel  -     Anti-microsomal antibody; Future  -     Anti-microsomal antibody    6  Screening, anemia, deficiency, iron  -     CBC and differential  -     Comprehensive metabolic panel        Subjective:  Chief Complaint   Patient presents with   • Follow-up     Test for STD  Sx began 2 mnths ago  Pt had hemorrhoids after having children but not same, feels different  No pain  BW pended  Pt has annoying cold, pain in jaw started last Monday  Dry cough and keeps Pt up at night  Pt had a sinus cold about 2 weeks ago  Pt feels has new thing  No fever  At-home COVID test was negative  Patient ID: Stephen Askew is a 37 y o  female  Pt had yearly evaluation with gyn  Was positive for hpv with follow up in 1 year  Negative for 16 and 18  History of conization   Pt states she had some genital warts that are now resolved  Eating healthy, increased fluids,   Feels like she is getting sick frequently  Has not had blood work done for a few years  Was positive for hashimotos but thyroid function has been normal in past        Review of Systems   Constitutional: Positive for fatigue  Negative for fever  HENT: Negative  Eyes: Negative  Respiratory: Negative  Negative for cough  Cardiovascular: Negative  Gastrointestinal: Negative  Endocrine: Negative  Genitourinary: Negative  Musculoskeletal: Negative  Skin: Negative  Allergic/Immunologic: Negative  Neurological: Negative  Psychiatric/Behavioral: Negative  The following portions of the patient's history were reviewed and updated as appropriate: allergies, current medications, past family history, past medical history, past social history, past surgical history and problem list     Objective:  Vitals:    02/27/23 0903   BP: 118/64   Pulse: (!) 51   Temp: 97 5 °F (36 4 °C)   SpO2: 99%   Weight: 62 1 kg (137 lb)      Physical Exam  Vitals and nursing note reviewed  Constitutional:       Appearance: She is well-developed  HENT:      Head: Normocephalic and atraumatic  Nose: Nose normal    Cardiovascular:      Rate and Rhythm: Normal rate and regular rhythm  Heart sounds: Normal heart sounds  Pulmonary:      Effort: Pulmonary effort is normal       Breath sounds: Normal breath sounds  Abdominal:      General: Bowel sounds are normal       Palpations: Abdomen is soft  Skin:     General: Skin is warm and dry  Neurological:      Mental Status: She is alert and oriented to person, place, and time  Psychiatric:         Behavior: Behavior normal          Thought Content:  Thought content normal          Judgment: Judgment normal

## 2023-03-08 ENCOUNTER — OFFICE VISIT (OUTPATIENT)
Dept: FAMILY MEDICINE CLINIC | Facility: CLINIC | Age: 43
End: 2023-03-08

## 2023-03-08 ENCOUNTER — TELEPHONE (OUTPATIENT)
Dept: OBGYN CLINIC | Facility: CLINIC | Age: 43
End: 2023-03-08

## 2023-03-08 VITALS
TEMPERATURE: 99.9 F | WEIGHT: 138 LBS | HEART RATE: 96 BPM | OXYGEN SATURATION: 97 % | DIASTOLIC BLOOD PRESSURE: 60 MMHG | SYSTOLIC BLOOD PRESSURE: 110 MMHG | HEIGHT: 66 IN | BODY MASS INDEX: 22.18 KG/M2

## 2023-03-08 DIAGNOSIS — A60.04 HERPES SIMPLEX VULVOVAGINITIS: ICD-10-CM

## 2023-03-08 DIAGNOSIS — R10.2 VAGINAL PAIN: ICD-10-CM

## 2023-03-08 DIAGNOSIS — N89.8 VAGINAL DISCHARGE: Primary | ICD-10-CM

## 2023-03-08 RX ORDER — METRONIDAZOLE 500 MG/1
500 TABLET ORAL EVERY 12 HOURS SCHEDULED
Qty: 14 TABLET | Refills: 0 | Status: SHIPPED | OUTPATIENT
Start: 2023-03-08 | End: 2023-03-15

## 2023-03-08 RX ORDER — VALACYCLOVIR HYDROCHLORIDE 1 G/1
1000 TABLET, FILM COATED ORAL 2 TIMES DAILY
Qty: 20 TABLET | Refills: 0 | Status: SHIPPED | OUTPATIENT
Start: 2023-03-08 | End: 2023-03-18

## 2023-03-08 RX ORDER — ACYCLOVIR 50 MG/G
OINTMENT TOPICAL EVERY 4 HOURS
Qty: 30 G | Refills: 1 | Status: SHIPPED | OUTPATIENT
Start: 2023-03-08 | End: 2023-03-13

## 2023-03-08 NOTE — TELEPHONE ENCOUNTER
Wayne Mendez called stating she has appt today for evaluation of yellow vaginal discharge,vaginal discomfort  Does not feel well in general  Temp this morning 102  Encouraged patient to be evaluated in urgent care due to elevated temp and possible alternate causes  F/U with [facility]G after urgent care eval  Patient in agreement

## 2023-03-08 NOTE — PROGRESS NOTES
[facility] discharge, yellow discharge, sores  Sexually active, has one partner, asymptomatic currently  Had full panel STD  Has fever as well 102 tmax yesterday  Doesn't use tampons  LMP 24 days ago, will be getting menses any day    Had tubal ligation

## 2023-03-08 NOTE — ASSESSMENT & PLAN NOTE
Start valtrex 1000mg twice daily for 10 days  Avoid intercourse, partner should get tested as well  Swab collected will call with results

## 2023-03-08 NOTE — PROGRESS NOTES
Name: Mia Goetz      : 1980      MRN: [id number]  Encounter Provider: ROWENA Alejo  Encounter Date: 3/8/2023   Encounter department: [place] [address]  Vaginal discharge  Assessment & Plan:  Suspicious for trich  Start metronidazole twice daily for 7 days  Swab and urine sent for testing will call with results and add or change medications  If pain worsens or fever worsens will need to go to ER immediately for IV antibiotics    Orders:  -     metroNIDAZOLE (FLAGYL) 500 mg tablet; Take 1 tablet (500 mg total) by mouth every 12 (twelve) hours for 7 days  -     SureSwab(R), Herpes Simplex Virus, Type 1 and 2 mRNA,TMA ([facility])  -     Ct, Ng, Trich vag by ELISE  -     SURESWAB(R) ADV BACTERIAL VAGINOSIS (BV), TMA    2  Vaginal pain  Assessment & Plan:  Suspicious for trich  Start metronidazole twice daily for 7 days  Swab and urine sent for testing will call with results and add or change medications  If pain worsens or fever worsens will need to go to ER immediately for IV antibiotics    Orders:  -     metroNIDAZOLE (FLAGYL) 500 mg tablet; Take 1 tablet (500 mg total) by mouth every 12 (twelve) hours for 7 days  -     SureSwab(R), Herpes Simplex Virus, Type 1 and 2 mRNA,TMA ([facility])  -     Ct, Ng, Trich vag by ELISE  -     SURESWAB(R) ADV BACTERIAL VAGINOSIS (BV), TMA    3  Herpes simplex vulvovaginitis  Assessment & Plan:  Start valtrex 1000mg twice daily for 10 days  Avoid intercourse, partner should get tested as well  Swab collected will call with results      Orders:  -     valACYclovir (VALTREX) 1,000 mg tablet; Take 1 tablet (1,000 mg total) by mouth 2 (two) times a day for 10 days  -     acyclovir (ZOVIRAX) 5 % ointment;  Apply topically every 4 (four) hours for 5 days  -     SureSwab(R), Herpes Simplex Virus, Type 1 and 2 mRNA,TMA ([facility])         Subjective      Here today with pelvic pain, yellow vaginal discharge, fever, genital sores/lesions that started 2 days ago  Sexually active, has one partner, partner is asymptomatic currently  Had some STD testing in December- GC negative  Doesn't use tampons  LMP 24 days ago, will be getting menses any day  Had tubal ligation             Review of Systems   Constitutional: Positive for activity change, appetite change, fatigue and fever  Respiratory: Negative  Cardiovascular: Negative  Genitourinary: Positive for genital sores, pelvic pain, vaginal discharge and vaginal pain  Negative for hematuria  Neurological: Positive for headaches  Hematological: Negative  Current Outpatient Medications on File Prior to Visit   Medication Sig   • citalopram (CeleXA) 20 mg tablet Take 1 5 tablets (30 mg total) by mouth daily   • LORazepam (ATIVAN) 0 5 mg tablet Take by mouth   • multivitamin (THERAGRAN) TABS Take 1 tablet by mouth daily   • Zinc 50 MG TABS Take by mouth       Objective     /60   Pulse 96   Temp 99 9 °F (37 7 °C) (Tympanic)   Ht 5' 6" (1 676 m)   Wt 62 6 kg (138 lb)   SpO2 97%   BMI 22 27 kg/m²     Physical Exam  Vitals and nursing note reviewed  Constitutional:       Appearance: Normal appearance  She is normal weight  She is ill-appearing  HENT:      Head: Normocephalic  Genitourinary:     Exam position: Supine  Labia:         Right: Rash present  Left: Rash present  Vagina: Vaginal discharge, erythema and lesions present  Cervix: Discharge and lesion present  No cervical motion tenderness  Comments: Sores vaginal walls and cervix  Vaginal walls erythematous   Thin watery yellow discharge noted    Sores on labias and near rectum  Neurological:      Mental Status: She is alert         Everet Reading

## 2023-03-08 NOTE — ASSESSMENT & PLAN NOTE
Suspicious for trich  Start metronidazole twice daily for 7 days  Swab and urine sent for testing will call with results and add or change medications  If pain worsens or fever worsens will need to go to ER immediately for IV antibiotics

## 2023-03-09 ENCOUNTER — HOSPITAL ENCOUNTER (EMERGENCY)
Facility: HOSPITAL | Age: 43
Discharge: HOME | End: 2023-03-09
Attending: EMERGENCY MEDICINE

## 2023-03-09 ENCOUNTER — APPOINTMENT (EMERGENCY)
Dept: ULTRASOUND IMAGING | Facility: HOSPITAL | Age: 43
End: 2023-03-09

## 2023-03-09 ENCOUNTER — TELEPHONE (OUTPATIENT)
Dept: FAMILY MEDICINE CLINIC | Facility: CLINIC | Age: 43
End: 2023-03-09

## 2023-03-09 VITALS
HEART RATE: 73 BPM | HEIGHT: 66 IN | DIASTOLIC BLOOD PRESSURE: 56 MMHG | TEMPERATURE: 98.4 F | SYSTOLIC BLOOD PRESSURE: 111 MMHG | BODY MASS INDEX: 22.18 KG/M2 | OXYGEN SATURATION: 98 % | WEIGHT: 138 LBS | RESPIRATION RATE: 16 BRPM

## 2023-03-09 DIAGNOSIS — R10.2 PELVIC PAIN: ICD-10-CM

## 2023-03-09 DIAGNOSIS — N89.8 VAGINAL DISCHARGE: Primary | ICD-10-CM

## 2023-03-09 DIAGNOSIS — N83.202 LEFT OVARIAN CYST: ICD-10-CM

## 2023-03-09 LAB
ALBUMIN SERPL BCP-MCNC: 3.9 G/DL (ref 3.5–5)
ALP SERPL-CCNC: 53 U/L (ref 34–104)
ALT SERPL W P-5'-P-CCNC: 12 U/L (ref 7–52)
ANION GAP SERPL CALCULATED.3IONS-SCNC: 9 MMOL/L (ref 4–13)
ANISOCYTOSIS BLD QL SMEAR: PRESENT
AST SERPL W P-5'-P-CCNC: 21 U/L (ref 13–39)
BACTERIA UR QL AUTO: ABNORMAL /HPF
BASOPHILS # BLD MANUAL: 0 THOUSAND/UL (ref 0–0.1)
BASOPHILS NFR MAR MANUAL: 0 % (ref 0–1)
BILIRUB SERPL-MCNC: 1.12 MG/DL (ref 0.2–1)
BILIRUB UR QL STRIP: NEGATIVE
BUN SERPL-MCNC: 10 MG/DL (ref 5–25)
CALCIUM SERPL-MCNC: 8.5 MG/DL (ref 8.4–10.2)
CHLORIDE SERPL-SCNC: 100 MMOL/L (ref 96–108)
CLARITY UR: ABNORMAL
CO2 SERPL-SCNC: 23 MMOL/L (ref 21–32)
COLOR UR: YELLOW
CREAT SERPL-MCNC: 0.89 MG/DL (ref 0.6–1.3)
EOSINOPHIL # BLD MANUAL: 0 THOUSAND/UL (ref 0–0.4)
EOSINOPHIL NFR BLD MANUAL: 0 % (ref 0–6)
ERYTHROCYTE [DISTWIDTH] IN BLOOD BY AUTOMATED COUNT: 13.4 % (ref 11.6–15.1)
EXT PREGNANCY TEST URINE: NEGATIVE
GFR SERPL CREATININE-BSD FRML MDRD: 78 ML/MIN/1.73SQ M
GLUCOSE SERPL-MCNC: 127 MG/DL (ref 65–140)
GLUCOSE UR STRIP-MCNC: NEGATIVE MG/DL
HCT VFR BLD AUTO: 40.8 % (ref 34.8–46.1)
HGB BLD-MCNC: 13 G/DL (ref 11.5–15.4)
HGB UR QL STRIP.AUTO: ABNORMAL
KETONES UR STRIP-MCNC: NEGATIVE MG/DL
LEUKOCYTE ESTERASE UR QL STRIP: ABNORMAL
LIPASE SERPL-CCNC: 66 U/L (ref 11–82)
LYMPHOCYTES # BLD AUTO: 0.52 THOUSAND/UL (ref 0.6–4.47)
LYMPHOCYTES # BLD AUTO: 8 % (ref 14–44)
MCH RBC QN AUTO: 28.4 PG (ref 26.8–34.3)
MCHC RBC AUTO-ENTMCNC: 31.9 G/DL (ref 31.4–37.4)
MCV RBC AUTO: 89 FL (ref 82–98)
MONOCYTES # BLD AUTO: 0.39 THOUSAND/UL (ref 0–1.22)
MONOCYTES NFR BLD: 6 % (ref 4–12)
NEUTROPHILS # BLD MANUAL: 5.62 THOUSAND/UL (ref 1.85–7.62)
NEUTS BAND NFR BLD MANUAL: 4 % (ref 0–8)
NEUTS SEG NFR BLD AUTO: 82 % (ref 43–75)
NITRITE UR QL STRIP: NEGATIVE
NON-SQ EPI CELLS URNS QL MICRO: ABNORMAL /HPF
OVALOCYTES BLD QL SMEAR: PRESENT
PH UR STRIP.AUTO: 6 [PH]
PLATELET # BLD AUTO: 155 THOUSANDS/UL (ref 149–390)
PLATELET BLD QL SMEAR: ABNORMAL
PMV BLD AUTO: 9.5 FL (ref 8.9–12.7)
POTASSIUM SERPL-SCNC: 3.5 MMOL/L (ref 3.5–5.3)
PROT SERPL-MCNC: 7.3 G/DL (ref 6.4–8.4)
PROT UR STRIP-MCNC: ABNORMAL MG/DL
RBC # BLD AUTO: 4.58 MILLION/UL (ref 3.81–5.12)
RBC #/AREA URNS AUTO: ABNORMAL /HPF
RBC MORPH BLD: PRESENT
SODIUM SERPL-SCNC: 132 MMOL/L (ref 135–147)
SP GR UR STRIP.AUTO: 1.02 (ref 1–1.03)
UROBILINOGEN UR STRIP-ACNC: <2 MG/DL
WBC # BLD AUTO: 6.53 THOUSAND/UL (ref 4.31–10.16)
WBC #/AREA URNS AUTO: ABNORMAL /HPF

## 2023-03-09 RX ORDER — ACETAMINOPHEN 325 MG/1
975 TABLET ORAL ONCE
Status: DISCONTINUED | OUTPATIENT
Start: 2023-03-09 | End: 2023-03-09 | Stop reason: HOSPADM

## 2023-03-09 RX ORDER — IBUPROFEN 600 MG/1
600 TABLET ORAL ONCE
Status: COMPLETED | OUTPATIENT
Start: 2023-03-09 | End: 2023-03-09

## 2023-03-09 RX ORDER — IBUPROFEN 600 MG/1
600 TABLET ORAL ONCE
Status: DISCONTINUED | OUTPATIENT
Start: 2023-03-09 | End: 2023-03-09 | Stop reason: HOSPADM

## 2023-03-09 RX ADMIN — IBUPROFEN 600 MG: 600 TABLET, FILM COATED ORAL at 13:29

## 2023-03-09 NOTE — DISCHARGE INSTRUCTIONS
The Emergency Department will contact you with any positive test results or otherwise results requiring treatment  If test results negative you will likely not be contacted if no change in treatment required  Additionally results can be reviewed in real-time at your convenience through [address]

## 2023-03-09 NOTE — TELEPHONE ENCOUNTER
Pt called to let you know that her temperature all night was around 102  She now has more severe abdominal pain and is getting headaches  She states that you told her yesterday but she can't remember your instructions as to when she should go to the hospital     Please advise

## 2023-03-09 NOTE — Clinical Note
Rosario Deng was seen and treated in our emergency department on 3/9/2023  No restrictions            Diagnosis:     Akilah Comment    She may return on this date: If you have any questions or concerns, please don't hesitate to call        Ben Diaz PA-C    ______________________________           _______________          _______________  Hospital Representative                              Date                                Time

## 2023-03-09 NOTE — ED PROVIDER NOTES
History  Chief Complaint   Patient presents with   • Pelvic Pain     Patient presents to the ED with c/o pelvic pain x2 days, states that she has had vaginal discharge since Monday, states Tuesday lesions began to appear and have chills  States that she has had fevers on and off as well, states started on flagyl yesterday with no improvements      HPI    36 y/o F with pertinent PMHX / PSHX of recently diagnosed with vaginal herpes and currently under treatment for trichomoniasis presents to the ED for evaluation as referred by her FP for Hx having fever 102  Was sent for having worsening pelvic pain and patient concerned for PID  Records reviewed from office visit from [facility] on 3/8/23 w/ worked up for STDs and lab tests pending  Sent to ED for ongoing L sided pelvic pain  Note details vaginal lesions on exam   History provided by patient  and above applicable records reviewed  States she was instructed to come to ED for pain control and possible IV abx  Testing indicates prior tested for BV, trich, and herpes  She was treated w/ Rx w/ Flagyl and Acyclovir  DDX:  Herpes Vaginalis / Possible PID / Ovarian torsion v  Ovarian cyst    Prior to Admission Medications   Prescriptions Last Dose Informant Patient Reported? Taking?    LORazepam (ATIVAN) 0 5 mg tablet   Yes No   Sig: Take by mouth   Zinc 50 MG TABS   Yes No   Sig: Take by mouth   acyclovir (ZOVIRAX) 5 % ointment   No No   Sig: Apply topically every 4 (four) hours for 5 days   citalopram (CeleXA) 20 mg tablet   No No   Sig: Take 1 5 tablets (30 mg total) by mouth daily   metroNIDAZOLE (FLAGYL) 500 mg tablet   No No   Sig: Take 1 tablet (500 mg total) by mouth every 12 (twelve) hours for 7 days   multivitamin (THERAGRAN) TABS   Yes No   Sig: Take 1 tablet by mouth daily   valACYclovir (VALTREX) 1,000 mg tablet   No No   Sig: Take 1 tablet (1,000 mg total) by mouth 2 (two) times a day for 10 days      Facility-Administered Medications: None Past Medical History:   Diagnosis Date   • Anxiety    • Depression    • Disease of thyroid gland     Hashimoto's   • Gall stones    • Gestational diabetes mellitus, class A1     Last Assessed: 04 Feb 2016   • H/O diagnostic mammography 02/26/2021    Bi-Rads 3  Probable asymmetric breast tissue in the outer left breast  Left diagnostic mammois recommended in 6 months to demonstrate stability  Left breast US 2/26/2021-Probable asymmetric breast tissue in the outer left breast  Left diagnostic mammo is recommended in 6 months to demonstrat stability   • Hashimoto's disease    • History of HPV infection    • History of posttraumatic stress disorder (PTSD)    • History of screening mammography 02/17/2021    Bi-Rads 0   • Migraine     Last Assessed: 22 Jan 2014   • Miscarriage    • Umbilical hernia        Past Surgical History:   Procedure Laterality Date   • CERVICAL CONIZATION   W/ LASER     • CHOLECYSTECTOMY     • EGD AND COLONOSCOPY N/A 09/25/2018    Procedure: EGD AND COLONOSCOPY;  Surgeon: Latonia Tolbert MD;  Location: [facility];   Service: Gastroenterology   • [facility] (HISTORICAL)  2007   • HERNIA REPAIR     • MAMMO (HISTORICAL)      [facility]   • MAMMO (HISTORICAL)  2021    [facility]   • MULTIPLE TOOTH EXTRACTIONS     • ME LAPAROSCOPY SURG CHOLECYSTECTOMY N/A 01/25/2017    Procedure: CHOLECYSTECTOMY LAPAROSCOPIC;  Surgeon: Ilan Ordoñez MD;  Location: [facility];  Service: General   • ME LAPS SURG CHOLECYSTECTOMY Licha Kanaris N/A 01/25/2017    Procedure: CHOLANGIOGRAM;  Surgeon: Ilan Ordoñez MD;  Location: [facility];  Service: General   • ME RPR UMBILICAL HRNA 5 YRS/> REDUCIBLE N/A 01/25/2017    Procedure: OPEN UMBILICAL HERNIA REPAIR ;  Surgeon: Ilan Ordoñez MD;  Location: [facility];  Service: General   • TUBAL LIGATION     • VAGINAL DELIVERY      X 2       Family History   Problem Relation Age of Onset   • Thyroid disease Mother    • Heart murmur Father    • No Known Problems Sister    • No Known Problems Daughter    • No Known Problems Maternal Grandmother    • Stroke Maternal Grandfather    • Breast cancer Paternal Grandmother    • Skin cancer Paternal Grandmother    • Colon cancer Paternal Grandfather    • No Known Problems Maternal Aunt    • No Known Problems Maternal Aunt    • Uterine cancer Neg Hx    • Ovarian cancer Neg Hx      I have reviewed and agree with the history as documented  E-Cigarette/Vaping   • E-Cigarette Use Never User      E-Cigarette/Vaping Substances     Social History     Tobacco Use   • Smoking status: Former   • Smokeless tobacco: Never   Vaping Use   • Vaping Use: Never used   Substance Use Topics   • Alcohol use: Yes     Alcohol/week: 10 0 standard drinks     Types: 10 Standard drinks or equivalent per week     Comment: Per [employer]: socially   • Drug use: Yes     Types: Marijuana     Comment: medical        Review of Systems   Constitutional: Negative for chills and fever  HENT: Negative for ear pain and sore throat  Eyes: Negative for pain and visual disturbance  Respiratory: Negative for cough and shortness of breath  Cardiovascular: Negative for chest pain and palpitations  Gastrointestinal: Negative for abdominal pain and vomiting  Genitourinary: Positive for genital sores, pelvic pain, vaginal discharge and vaginal pain (d/t vaginal lesions  )  Negative for dysuria, hematuria and vaginal bleeding  Musculoskeletal: Negative for arthralgias and back pain  Skin: Negative for color change and rash  Neurological: Negative for seizures and syncope  All other systems reviewed and are negative  Physical Exam  Physical Exam  Vitals and nursing note reviewed  Constitutional:       General: She is not in acute distress  Appearance: She is well-developed  HENT:      Head: Normocephalic and atraumatic  Eyes:      Conjunctiva/sclera: Conjunctivae normal    Cardiovascular:      Rate and Rhythm: Normal rate and regular rhythm  Heart sounds: No murmur heard  Pulmonary:      Effort: Pulmonary effort is normal  No respiratory distress  Breath sounds: Normal breath sounds  Abdominal:      General: Bowel sounds are normal       Palpations: Abdomen is soft  Tenderness: There is no abdominal tenderness  There is no right CVA tenderness, left CVA tenderness, guarding or rebound  Negative signs include Esquivel's sign and psoas sign  Genitourinary:     Comments: Deferred genital examination d/t prior examined on 3/8  Mild L side pelvic tenderness to palpation  Musculoskeletal:         General: No swelling  Cervical back: Neck supple  Skin:     General: Skin is warm and dry  Capillary Refill: Capillary refill takes less than 2 seconds  Neurological:      Mental Status: She is alert     Psychiatric:         Mood and Affect: Mood normal          Vital Signs  ED Triage Vitals [03/09/23 1139]   Temperature Pulse Respirations Blood Pressure SpO2   98 4 °F (36 9 °C) 93 18 135/62 100 %      Temp Source Heart Rate Source Patient Position - Orthostatic VS BP Location FiO2 (%)   Temporal Monitor Sitting Left arm --      Pain Score       5           Vitals:    03/09/23 1139 03/09/23 1330 03/09/23 1530   BP: 135/62 (!) 93/46 111/56   Pulse: 93 87 73   Patient Position - Orthostatic VS: Sitting Lying Lying         Visual Acuity      ED Medications  Medications   ibuprofen (MOTRIN) tablet 600 mg (600 mg Oral Given 3/9/23 1329)       Diagnostic Studies  Results Reviewed     Procedure Component Value Units Date/Time    Urine Microscopic [[id number]]  (Abnormal) Collected: 03/09/23 1557    Lab Status: Final result Specimen: Urine, Clean Catch Updated: 03/09/23 1927     RBC, UA Innumerable /hpf      WBC, UA 2-4 /hpf      Epithelial Cells Occasional /hpf      Bacteria, UA Occasional /hpf     UA w Reflex to Microscopic w Reflex to Culture [[id number]]  (Abnormal) Collected: 03/09/23 1557    Lab Status: Final result Specimen: Urine, Clean Catch Updated: 03/09/23 1902     Color, UA Yellow     Clarity, UA Cloudy     Specific Gravity, UA 1 025     pH, UA 6 0     Leukocytes, UA Small     Nitrite, UA Negative     Protein,  (2+) mg/dl      Glucose, UA Negative mg/dl      Ketones, UA Negative mg/dl      Urobilinogen, UA <2 0 mg/dl      Bilirubin, UA Negative     Occult Blood, UA Large    Blood culture #1 [[id number]] Collected: 03/09/23 1213    Lab Status: Preliminary result Specimen: Blood from Arm, Left Updated: 03/09/23 1701     Blood Culture Received in Microbiology Lab  Culture in Progress  Blood culture #2 [[id number]] Collected: 03/09/23 1213    Lab Status: Preliminary result Specimen: Blood from Arm, Right Updated: 03/09/23 1701     Blood Culture Received in Microbiology Lab  Culture in Progress  POCT pregnancy, urine [[id number]]  (Normal) Resulted: 03/09/23 1604    Lab Status: Final result Specimen: Urine Updated: 03/09/23 1604     EXT Preg Test, Ur Negative     Control --    CBC and differential [[id number]]  (Normal) Collected: 03/09/23 1213    Lab Status: Final result Specimen: Blood from Arm, Right Updated: 03/09/23 1302     WBC 6 53 Thousand/uL      RBC 4 58 Million/uL      Hemoglobin 13 0 g/dL      Hematocrit 40 8 %      MCV 89 fL      MCH 28 4 pg      MCHC 31 9 g/dL      RDW 13 4 %      MPV 9 5 fL      Platelets 273 Thousands/uL     Narrative: This is an appended report  These results have been appended to a previously verified report      Manual Differential(PHLEBS Do Not Order) [[id number]]  (Abnormal) Collected: 03/09/23 1213    Lab Status: Final result Specimen: Blood from Arm, Right Updated: 03/09/23 1302     Segmented % 82 %      Bands % 4 %      Lymphocytes % 8 %      Monocytes % 6 %      Eosinophils, % 0 %      Basophils % 0 %      Absolute Neutrophils 5 62 Thousand/uL      Lymphocytes Absolute 0 52 Thousand/uL      Monocytes Absolute 0 39 Thousand/uL      Eosinophils Absolute 0 00 Thousand/uL      Basophils Absolute 0 00 Thousand/uL      Total Counted --     RBC Morphology Present     Anisocytosis Present     Ovalocytes Present     Platelet Estimate Decreased    Lipase [[id number]]  (Normal) Collected: 03/09/23 1213    Lab Status: Final result Specimen: Blood from Arm, Right Updated: 03/09/23 1243     Lipase 66 u/L     CMP [[id number]]  (Abnormal) Collected: 03/09/23 1213    Lab Status: Final result Specimen: Blood from Arm, Right Updated: 03/09/23 1243     Sodium 132 mmol/L      Potassium 3 5 mmol/L      Chloride 100 mmol/L      CO2 23 mmol/L      ANION GAP 9 mmol/L      BUN 10 mg/dL      Creatinine 0 89 mg/dL      Glucose 127 mg/dL      Calcium 8 5 mg/dL      AST 21 U/L      ALT 12 U/L      Alkaline Phosphatase 53 U/L      Total Protein 7 3 g/dL      Albumin 3 9 g/dL      Total Bilirubin 1 12 mg/dL      eGFR 78 ml/min/1 73sq m     Narrative:      [place] guidelines for Chronic Kidney Disease (CKD):   •  Stage 1 with normal or high GFR (GFR > 90 mL/min/1 73 square meters)  •  Stage 2 Mild CKD (GFR = 60-89 mL/min/1 73 square meters)  •  Stage 3A Moderate CKD (GFR = 45-59 mL/min/1 73 square meters)  •  Stage 3B Moderate CKD (GFR = 30-44 mL/min/1 73 square meters)  •  Stage 4 Severe CKD (GFR = 15-29 mL/min/1 73 square meters)  •  Stage 5 End Stage CKD (GFR <15 mL/min/1 73 square meters)  Note: GFR calculation is accurate only with a steady state creatinine                 US pelvis complete w transvaginal   Final Result by Solange Bower MD (03/09 9627)       1  No evidence of torsion  2   Heterogeneous myometrium with ill-defined borders of the endometrium  These findings are suggestive of adenomyosis                             Workstation performed: [id number]                    Procedures  Procedures         ED Course  ED Course as of 03/10/23 0633   Thu Mar 09, 2023   1250 Pending U/S                                             Medical Decision Making  Pelvic pain: acute illness or injury  Vaginal discharge: acute illness or injury  Amount and/or Complexity of Data Reviewed  Labs: ordered  Radiology: ordered  Risk  Prescription drug management  Medical Decision Making    ED Course consisting of improved pain w/ ibuprofen and further w/u w/ GC / Chlamydia urine probes with DDXs considered also for GC/Chlamydia and reasonably worked up with further testing and U/S w/ ruled out ovarian torsion with reasonable Dx ruled in by history and exam and U/S demonstrating L ovarian cyst   Current Labs:  W/o significant indicators for infectionPMHX contributors prior dx w/ STI and pending Dx  Considerations for hospitalization given but not required d/t non acute presentation and w/o significant findings  Discussion: w/ paitent for empiric treatment in ED w/ Rocephin and Doxy offered for treatment of GC and Chlamydia but prefers to wait for testing  Discussed referral back to OBGYN and possible L sided pelvic pain d/t L ovarian follicle  Disposition  Final diagnoses:   Vaginal discharge   Pelvic pain   Left ovarian cyst     Time reflects when diagnosis was documented in both MDM as applicable and the Disposition within this note     Time User Action Codes Description Comment    3/9/2023  3:31 PM Romana Macadam Add [N89 8] Vaginal discharge     3/9/2023  3:31 PM Romana Macadam Add [R10 2] Pelvic pain     3/10/2023  6:31 AM Romana Macadam Add [D76 315] Left ovarian cyst       ED Disposition     ED Disposition   Discharge    Condition   Stable    Date/Time   Thu Mar 9, 2023  3:26 PM    Comment   [facility] discharge to home/self care  Follow-up Information     Follow up With Specialties Details Why Contact Info Additional [id number] [address] Call today Call for follow up appointment   [address] [address]  [place], [place] [address] [place] [address], [id number], [phone]          Discharge Medication List as of 3/9/2023  4:11 PM      CONTINUE these medications which have NOT CHANGED    Details   acyclovir (ZOVIRAX) 5 % ointment Apply topically every 4 (four) hours for 5 days, Starting Wed 3/8/2023, Until Mon 3/13/2023, Normal      citalopram (CeleXA) 20 mg tablet Take 1 5 tablets (30 mg total) by mouth daily, Starting Thu 2/16/2023, Normal      LORazepam (ATIVAN) 0 5 mg tablet Take by mouth, Starting Mon 12/2/2013, Historical Med      metroNIDAZOLE (FLAGYL) 500 mg tablet Take 1 tablet (500 mg total) by mouth every 12 (twelve) hours for 7 days, Starting Wed 3/8/2023, Until Wed 3/15/2023, Normal      multivitamin (THERAGRAN) TABS Take 1 tablet by mouth daily, Historical Med      valACYclovir (VALTREX) 1,000 mg tablet Take 1 tablet (1,000 mg total) by mouth 2 (two) times a day for 10 days, Starting Wed 3/8/2023, Until Sat 3/18/2023, Normal      Zinc 50 MG TABS Take by mouth, Historical Med                 PDMP Review     None          ED Provider  Electronically Signed by           Ben Diaz PA-C  03/10/23 7731

## 2023-03-10 DIAGNOSIS — A60.04 HERPES SIMPLEX VULVOVAGINITIS: ICD-10-CM

## 2023-03-10 LAB
BV BACTERIA RRNA VAG QL NAA+PROBE: POSITIVE
C TRACH RRNA SPEC QL NAA+PROBE: NOT DETECTED
HSV1 DNA SPEC QL NAA+PROBE: NOT DETECTED
HSV2 DNA SPEC QL NAA+PROBE: DETECTED
N GONORRHOEA RRNA SPEC QL NAA+PROBE: NOT DETECTED
SPECIMEN SOURCE: NORMAL
T VAGINALIS RRNA SPEC QL NAA+PROBE: NOT DETECTED

## 2023-03-10 RX ORDER — VALACYCLOVIR HYDROCHLORIDE 1 G/1
1000 TABLET, FILM COATED ORAL 2 TIMES DAILY
Qty: 20 TABLET | Refills: 0 | OUTPATIENT
Start: 2023-03-10 | End: 2023-03-20

## 2023-03-10 NOTE — TELEPHONE ENCOUNTER
Pt called back  States that she wants her valtrex sent to [facility] in [address], [place], as she is unable to come up to [place]

## 2023-03-10 NOTE — TELEPHONE ENCOUNTER
Called Pt back to confirm she is taking the flagyl  Pt confirmed she is actually taking both meds  Pt was confused and did not know valacyclovir was also valtrex

## 2023-03-10 NOTE — TELEPHONE ENCOUNTER
Pt states that she has severe pain  "It's like an Kurt burn but you feel it all the time"     She did go to the ER yesterday and her fever broke so in that regard she is feeling better  Now she would like to know if you could call in Valtrex for her   "I think that's what has helped this in the past "    Please advise    DANA [phone]

## 2023-03-10 NOTE — TELEPHONE ENCOUNTER
Can you please verify  before I send valtrex that she picked up the metronidazole on Wednesday at the [facility], if not she needs to be on both and I will send both to [facility]

## 2023-03-10 NOTE — TELEPHONE ENCOUNTER
Other reason request has been forwarded to provider: Pt called back  States that she wants her valtrex sent instead to [facility] in [address], [place], as she is unable to come up to [place]

## 2023-03-11 NOTE — RESULT ENCOUNTER NOTE
Your STD testing came back positive for herpes virus and bacterial vaginosis  Your are on the appropriate medications and should complete the entire course of valcyclovir and metronidazole  Gonorrhea, Chlamydia and Trichomonas were negative  You should abstain from intercourse until all symptoms have completely resolved   Your partner should be tested as well

## 2023-03-12 LAB
BACTERIA BLD CULT: NORMAL
BACTERIA BLD CULT: NORMAL

## 2023-03-13 ENCOUNTER — TELEPHONE (OUTPATIENT)
Dept: FAMILY MEDICINE CLINIC | Facility: CLINIC | Age: 43
End: 2023-03-13

## 2023-03-13 NOTE — TELEPHONE ENCOUNTER
Spoke to patient to advise my chart messages was been send to her in regard questions from her recent test results

## 2023-03-13 NOTE — TELEPHONE ENCOUNTER
----- Message from Karrie Hanson, [address] sent at 3/11/2023  1:08 PM EST -----  Your STD testing came back positive for herpes virus and bacterial vaginosis  Your are on the appropriate medications and should complete the entire course of valcyclovir and metronidazole  Gonorrhea, Chlamydia and Trichomonas were negative  You should abstain from intercourse until all symptoms have completely resolved   Your partner should be tested as well

## 2023-03-13 NOTE — TELEPHONE ENCOUNTER
Results -    Pt is aware of results and advice  Pt is concerned about future herpes sx and the use of the antiviral medication  Pt has several questions:    1  Are the sx of a future outbreak of sx that she should be aware of?     2  Is she supposed to take valcyclovir everyday for continuous use? Is the medication used only upon outbreak of sx? Pt stated that she had read/heard that the medication prevents outbreaks and she has significant concern about not using the medication continuously and then experiencing an outbreak  As a result, pt is under the impression that she should be able to refill the medication and use it ongoing  Please review and advise patient regarding abovementioned questions

## 2023-03-13 NOTE — TELEPHONE ENCOUNTER
Future outbreaks may include burning in area before the sores develop  She is only prescribed valtrex for this current outbreak  We typically do not prescribe suppressive therapy unless she is getting  frequent outbreaks  If she develops an outbreak she would want to start on medication again if possible within 24 hours of symptom onset  We can discuss this after her symptoms completely resolve  If she is still having sores after completing the 10 day course of valtrex then she will need to extend her current treatment regimen  I would recommend she schedule a follow up office visit either with us or her GYN  Please schedule her for an office visit if she would like to discuss further closer to when she is done her 10 days of valacyclovir March 18 approximately

## 2023-03-14 LAB
BACTERIA BLD CULT: NORMAL
BACTERIA BLD CULT: NORMAL

## 2023-03-16 ENCOUNTER — TELEPHONE (OUTPATIENT)
Dept: OBGYN CLINIC | Facility: CLINIC | Age: 43
End: 2023-03-16

## 2023-03-16 NOTE — TELEPHONE ENCOUNTER
Pt called and informed she was seen in the ER for Left sided pelvic pain, fever and pain from newly vaginal herpes outbreak  Presented to the ER for pain control and possible IV antibiotic therapy  Pt was placed on  Flagyl for trichomoniasis and Valtrex by PCP, prior to ER visit  SEE ER notes:  PCP recommends follow-up with GYN   to discuss suppression therapy  Pt has an appointment scheduled for 3/22/23

## 2023-03-22 ENCOUNTER — OFFICE VISIT (OUTPATIENT)
Dept: OBGYN CLINIC | Facility: CLINIC | Age: 43
End: 2023-03-22
Payer: COMMERCIAL

## 2023-03-22 VITALS
SYSTOLIC BLOOD PRESSURE: 114 MMHG | HEIGHT: 66 IN | BODY MASS INDEX: 21.86 KG/M2 | WEIGHT: 136 LBS | DIASTOLIC BLOOD PRESSURE: 64 MMHG

## 2023-03-22 DIAGNOSIS — A60.04 HERPES SIMPLEX VULVOVAGINITIS: Primary | ICD-10-CM

## 2023-03-22 DIAGNOSIS — R10.2 PELVIC PAIN: ICD-10-CM

## 2023-03-22 DIAGNOSIS — N89.8 VAGINAL DISCHARGE: ICD-10-CM

## 2023-03-22 PROCEDURE — 99213 OFFICE O/P EST LOW 20 MIN: CPT | Performed by: OBSTETRICS & GYNECOLOGY

## 2023-04-03 ENCOUNTER — TELEPHONE (OUTPATIENT)
Dept: OBGYN CLINIC | Facility: CLINIC | Age: 43
End: 2023-04-03

## 2023-04-03 NOTE — TELEPHONE ENCOUNTER
Josph Ormond called stating she saw dr Celestino Mendoza almost 2 weeks ago  He was going to send in a prescription for valtrex  Pharmacist did not receive  Confirmed [facility]   Dr Prerna Willingham, please address refill request for valtrex

## 2023-04-10 RX ORDER — VALACYCLOVIR HYDROCHLORIDE 500 MG/1
500 TABLET, FILM COATED ORAL DAILY
Qty: 30 TABLET | Refills: 13 | Status: SHIPPED | OUTPATIENT
Start: 2023-04-10 | End: 2023-05-10

## 2023-04-10 NOTE — TELEPHONE ENCOUNTER
Karthik haji she is waiting for her valtrex to be sent to pharmacy  Completely out   Tiger text message to Dr Lisa Ford

## 2023-05-23 DIAGNOSIS — F34.1 DYSTHYMIA: ICD-10-CM

## 2023-05-23 RX ORDER — CITALOPRAM 20 MG/1
TABLET ORAL
Qty: 135 TABLET | Refills: 0 | Status: SHIPPED | OUTPATIENT
Start: 2023-05-23

## 2023-06-05 PROBLEM — R10.2 PELVIC PAIN: Status: ACTIVE | Noted: 2023-03-22

## 2023-06-05 PROBLEM — R10.2 PELVIC PAIN: Status: ACTIVE | Noted: 2023-06-05

## 2023-06-05 NOTE — ASSESSMENT & PLAN NOTE
Discussed HSV diagnosis and options for suppression vs episodic treatment  Questions answered   Will provide Rx for episodic treatment (with extra doses) for now

## 2023-06-05 NOTE — ASSESSMENT & PLAN NOTE
Reviewed pelvic US, which is overall reassuring with no obvious pathology  There are some suggestions of adenomyosis  Recommend monitoring only

## 2023-06-05 NOTE — PROGRESS NOTES
[address] [facility], [address], [address] [facility]    Assessment/Plan:  1  Herpes simplex vulvovaginitis  Assessment & Plan:  Discussed HSV diagnosis and options for suppression vs episodic treatment  Questions answered  Will provide Rx for episodic treatment (with extra doses) for now    Orders:  -     valACYclovir (VALTREX) 500 mg tablet; Take 1 tablet (500 mg total) by mouth daily    2  Vaginal discharge  -     Ambulatory Referral to Obstetrics / Gynecology    3  Pelvic pain  Assessment & Plan:  Reviewed pelvic US, which is overall reassuring with no obvious pathology  There are some suggestions of adenomyosis  Recommend monitoring only  Orders:  -     Ambulatory Referral to Obstetrics / Gynecology      Subjective:   Keith Marley is a 37 y o  [id number]   CC:   Chief Complaint   Patient presents with   • Gynecology Problem     Pt here to go over ultrasound and to get medicine management for HSV       HPI: Recently tested positive    ROS: Negative except as noted in HPI    Patient's last menstrual period was 03/08/2023  She  reports being sexually active and has had partner(s) who are male  She reports using the following method of birth control/protection: Female Sterilization  The following portions of the patient's history were reviewed and updated as appropriate:   Past Medical History:   Diagnosis Date   • Anxiety    • Depression    • Disease of thyroid gland     Hashimoto's   • Gall stones    • Gestational diabetes mellitus, class A1     Last Assessed: 04 Feb 2016   • H/O diagnostic mammography 02/26/2021    Bi-Rads 3  Probable asymmetric breast tissue in the outer left breast  Left diagnostic mammois recommended in 6 months to demonstrate stability   Left breast US 2/26/2021-Probable asymmetric breast tissue in the outer left breast  Left diagnostic mammo is recommended in 6 months to demonstrat stability   • Hashimoto's disease    • History of HPV infection    • History of posttraumatic stress disorder (PTSD)    • History of screening mammography 02/17/2021    Bi-Rads 0   • Migraine     Last Assessed: 22 Jan 2014   • Miscarriage    • Umbilical hernia      Past Surgical History:   Procedure Laterality Date   • CERVICAL CONIZATION   W/ LASER     • CHOLECYSTECTOMY     • EGD AND COLONOSCOPY N/A 09/25/2018    Procedure: EGD AND COLONOSCOPY;  Surgeon: Brooklynn Walter MD;  Location: [facility];   Service: Gastroenterology   • [address] (HISTORICAL)  2007   • HERNIA REPAIR     • MAMMO (HISTORICAL)      [facility]   • MAMMO (HISTORICAL)  2021    [facility]   • MULTIPLE TOOTH EXTRACTIONS     • GA LAPAROSCOPY SURG CHOLECYSTECTOMY N/A 01/25/2017    Procedure: CHOLECYSTECTOMY LAPAROSCOPIC;  Surgeon: Laura Parson MD;  Location: [facility];  Service: General   • GA LAPS SURG CHOLECYSTECTOMY Onel Daly N/A 01/25/2017    Procedure: CHOLANGIOGRAM;  Surgeon: Laura Parson MD;  [facility];  Service: General   • GA RPR UMBILICAL HRNA 5 YRS/> REDUCIBLE N/A 01/25/2017    Procedure: OPEN UMBILICAL HERNIA REPAIR ;  Surgeon: Laura Parson MD;  [facility];  Service: General   • TUBAL LIGATION     • VAGINAL DELIVERY      X 2     Family History   Problem Relation Age of Onset   • Thyroid disease Mother    • Heart murmur Father    • No Known Problems Sister    • No Known Problems Daughter    • No Known Problems Maternal Grandmother    • Stroke Maternal Grandfather    • Breast cancer Paternal Grandmother    • Skin cancer Paternal Grandmother    • Colon cancer Paternal Grandfather    • No Known Problems Maternal Aunt    • No Known Problems Maternal Aunt    • Uterine cancer Neg Hx    • Ovarian cancer Neg Hx      Social History     Socioeconomic History   • Marital status:      Spouse name: None   • Number of children: None   • Years of education: None   • Highest education level: None   Occupational History   • None   Tobacco Use   • Smoking status: Former "    Passive exposure: Never   • Smokeless tobacco: Never   Vaping Use   • Vaping Use: Never used   Substance and Sexual Activity   • Alcohol use: Yes     Alcohol/week: 10 0 standard drinks of alcohol     Types: 10 Standard drinks or equivalent per week     Comment: Per [employer]: socially   • Drug use: Yes     Types: Marijuana     Comment: medical    • Sexual activity: Yes     Partners: Male     Birth control/protection: Female Sterilization     Comment: multiple partners in past year   Other Topics Concern   • None   Social History Narrative    Exercise: Occassionally    Domestic violence: No    Occupation     Alchohol use socially on weekends (1-4/week)    Current illegal drug use Yes     Social Determinants of Health     Financial Resource Strain: Not on file   Food Insecurity: Not on file   Transportation Needs: Not on file   Physical Activity: Not on file   Stress: Not on file   Social Connections: Not on file   Intimate Partner Violence: Not on file   Housing Stability: Not on file     Outpatient Medications Marked as Taking for the 3/22/23 encounter (Office Visit) with Clearance MD Libby   Medication   • acyclovir (ZOVIRAX) 5 % ointment   • LORazepam (ATIVAN) 0 5 mg tablet   • multivitamin (THERAGRAN) TABS   • valACYclovir (VALTREX) 500 mg tablet   • Zinc 50 MG TABS   • [DISCONTINUED] citalopram (CeleXA) 20 mg tablet   • [DISCONTINUED] valACYclovir (VALTREX) 1,000 mg tablet     Allergies   Allergen Reactions   • Septra [Sulfamethoxazole-Trimethoprim] Rash   • Sulfa Antibiotics Rash     Category: Allergy;            Objective:  /64 (BP Location: Right arm, Patient Position: Sitting, Cuff Size: Standard)   Ht 5' 6\" (1 676 m)   Wt 61 7 kg (136 lb)   LMP 03/08/2023   BMI 21 95 kg/m²        General Appearance: alert and oriented, in no acute distress  Abdomen: Soft, non-tender, non-distended, no masses, no rebound or guarding    Pelvic:       External genitalia: Normal appearance, no " abnormal pigmentation, no lesions or masses  Normal Bartholin's and [place]'s  Urinary system: Urethral meatus normal, bladder non-tender  Vaginal: normal mucosa without prolapse or lesions  Normal-appearing physiologic discharge  Cervix: Normal-appearing, well-epithelialized, no gross lesions or masses  No cervical motion tenderness  Adnexa: No adnexal masses or tenderness noted  Uterus: Normal-sized, regular contour, midline, mobile, no uterine tenderness  Extremities: Normal range of motion     Skin: normal, no rash or abnormalities  Neurologic: alert, oriented x3  Psychiatric: Appropriate affect, mood stable, cooperative with exam         Shyanne Chávez MD

## 2023-08-31 DIAGNOSIS — F34.1 DYSTHYMIA: ICD-10-CM

## 2023-08-31 RX ORDER — CITALOPRAM 20 MG/1
30 TABLET ORAL DAILY
Qty: 135 TABLET | Refills: 0 | Status: SHIPPED | OUTPATIENT
Start: 2023-08-31

## 2023-12-07 ENCOUNTER — TELEPHONE (OUTPATIENT)
Dept: OBGYN CLINIC | Facility: CLINIC | Age: 43
End: 2023-12-07

## 2023-12-07 DIAGNOSIS — R92.8 ABNORMAL MAMMOGRAM: Primary | ICD-10-CM

## 2023-12-07 NOTE — TELEPHONE ENCOUNTER
Pt left a message requesting to have a Diagnostic mammogram vs screening mammogram. Pt has an appointment scheduled for  12/11/23. Upon review of chart -Mammogram screening from 2/17/2021-Birads 0-follow-up diagnostic mammogram and US completed 2/26/21.  6 month Diagnostic mammogram was recommended, not completed. Spoke with Irasema De La Torre in radiology , [facility] in [facility]  to inquire what imaging is required. Call transferred to regional [facility], spoke with [facility] and she will discuss with radiology tech for appropriate imaging.   Left a message to call back to further address,

## 2023-12-07 NOTE — TELEPHONE ENCOUNTER
Received a call from Lizeth Manzo ( breast [facility] [place]) and after review of prior imaging from  2/26/21 and recommendation for 6 month f/u Dx mammogram that was not completed. Lizeth Manzo is requesting to have a Bilat diagnostic mammogram order placed. Lizeth Manzo from [facility] [place] will notify pt to reschedule appointment for 12/11/23, as diagnostic imaging is recommended.

## 2023-12-14 DIAGNOSIS — F34.1 DYSTHYMIA: ICD-10-CM

## 2023-12-14 RX ORDER — CITALOPRAM 20 MG/1
30 TABLET ORAL DAILY
Qty: 135 TABLET | Refills: 0 | Status: SHIPPED | OUTPATIENT
Start: 2023-12-14

## 2024-01-10 ENCOUNTER — TELEPHONE (OUTPATIENT)
Dept: OBGYN CLINIC | Facility: CLINIC | Age: 44
End: 2024-01-10

## 2024-01-10 DIAGNOSIS — A60.04 HERPES SIMPLEX VULVOVAGINITIS: ICD-10-CM

## 2024-01-10 RX ORDER — VALACYCLOVIR HYDROCHLORIDE 500 MG/1
500 TABLET, FILM COATED ORAL DAILY
Qty: 30 TABLET | Refills: 13 | Status: SHIPPED | OUTPATIENT
Start: 2024-01-10 | End: 2024-02-09

## 2024-01-10 NOTE — TELEPHONE ENCOUNTER
Dosing for episodic is 500 mg BID for 3 days. She can just get the next refill a few days sooner. RX sent

## 2024-01-10 NOTE — TELEPHONE ENCOUNTER
Pt left a message she is having a genital herpes outbreak. Pt is currently taking Valtrex 500 mg daily for suppression therapy and calling to inquire proper dosing to take with new outbreak.  Pt will needs a new script sent to indicate dosing for current outbreak and new script for daily suppression therapy.   Preferred Pharmacy: [facility]  Pharmacy on file.     Please advise.

## 2024-01-10 NOTE — TELEPHONE ENCOUNTER
After Visit Summary   10/5/2020    Moriah Duggan    MRN: [id number]           Visit Information     Date & Time  10/5/2020  8:00 AM Provider  [address] [facility]  Phone  [phone] Left a message for patient recommending to increase Valtrex for episodic occurrence to 500 mg BID x 3 days. New script was sent to pharmacy tonja have if she needs a new script sooner.     Instructions:  To schedule a test at any [facility], call [facility] Sched[facility] at [phone], Monday through Friday between 7:30am to 6pm and on Saturday between [address] If you receive a survey from [employer], please take a few minutes to complete it and provide feedback. We strive to deliver the best patient experience and are looking for ways to make improvements. Your feedback will help us do so.  For more information EMERGENCY ROOM Life-threatening emergencies needing immediate intervention at a hospital emergency room.  Average cost  $2,300*   *Cost varies based on your insurance coverage  For more information about hours, locations or appointment options available at

## 2024-01-23 ENCOUNTER — HOSPITAL ENCOUNTER (OUTPATIENT)
Dept: ULTRASOUND IMAGING | Facility: CLINIC | Age: 44
Discharge: HOME | End: 2024-01-23
Payer: COMMERCIAL

## 2024-01-23 ENCOUNTER — HOSPITAL ENCOUNTER (OUTPATIENT)
Dept: MAMMOGRAPHY | Facility: CLINIC | Age: 44
Discharge: HOME | End: 2024-01-23
Payer: COMMERCIAL

## 2024-01-23 VITALS — HEIGHT: 66 IN | WEIGHT: 144 LBS | BODY MASS INDEX: 23.14 KG/M2

## 2024-01-23 DIAGNOSIS — R92.8 ABNORMAL MAMMOGRAM OF LEFT BREAST: ICD-10-CM

## 2024-01-23 DIAGNOSIS — R92.8 ABNORMAL MAMMOGRAM: ICD-10-CM

## 2024-01-23 PROCEDURE — 76642 ULTRASOUND BREAST LIMITED: CPT

## 2024-01-23 PROCEDURE — 77066 DX MAMMO INCL CAD BI: CPT

## 2024-01-23 PROCEDURE — G0279 TOMOSYNTHESIS, MAMMO: HCPCS

## 2024-01-25 DIAGNOSIS — R92.8 ABNORMAL MAMMOGRAM OF RIGHT BREAST: Primary | ICD-10-CM

## 2024-01-26 ENCOUNTER — TELEPHONE (OUTPATIENT)
Dept: FAMILY MEDICINE CLINIC | Facility: CLINIC | Age: 44
End: 2024-01-26

## 2024-01-26 NOTE — TELEPHONE ENCOUNTER
Patient aware       ----- Message from Nicolle Saldana DO sent at 1/25/2024 11:39 PM EST -----  There are some clustered cysts in the right breast that appear benign but a 6 month follow up is recommended with a ultrasound and diagnostic mammogram. The orders are in and you can schedule.  The left breast appears ok- 1 year mammogram.

## 2024-02-01 ENCOUNTER — OFFICE VISIT (OUTPATIENT)
Dept: FAMILY MEDICINE CLINIC | Facility: CLINIC | Age: 44
End: 2024-02-01
Payer: COMMERCIAL

## 2024-02-01 VITALS
BODY MASS INDEX: 22.62 KG/M2 | HEART RATE: 78 BPM | DIASTOLIC BLOOD PRESSURE: 78 MMHG | TEMPERATURE: 95.6 F | HEIGHT: 67 IN | OXYGEN SATURATION: 96 % | WEIGHT: 144.12 LBS | SYSTOLIC BLOOD PRESSURE: 124 MMHG

## 2024-02-01 DIAGNOSIS — Z13.220 SCREENING, LIPID: ICD-10-CM

## 2024-02-01 DIAGNOSIS — N95.1 PERIMENOPAUSE: ICD-10-CM

## 2024-02-01 DIAGNOSIS — E80.6 HYPERBILIRUBINEMIA: ICD-10-CM

## 2024-02-01 DIAGNOSIS — E87.1 HYPONATREMIA: ICD-10-CM

## 2024-02-01 DIAGNOSIS — E06.3 HASHIMOTO'S THYROIDITIS: ICD-10-CM

## 2024-02-01 DIAGNOSIS — Z00.00 WELL ADULT EXAM: Primary | ICD-10-CM

## 2024-02-01 DIAGNOSIS — Z13.0 SCREENING, ANEMIA, DEFICIENCY, IRON: ICD-10-CM

## 2024-02-01 DIAGNOSIS — Z13.29 SCREENING FOR ENDOCRINE DISORDER: ICD-10-CM

## 2024-02-01 DIAGNOSIS — R53.82 CHRONIC FATIGUE: ICD-10-CM

## 2024-02-01 DIAGNOSIS — F33.41 RECURRENT MAJOR DEPRESSIVE DISORDER, IN PARTIAL REMISSION (HCC): ICD-10-CM

## 2024-02-01 DIAGNOSIS — Z13.29 SCREENING FOR THYROID DISORDER: ICD-10-CM

## 2024-02-01 PROCEDURE — 99213 OFFICE O/P EST LOW 20 MIN: CPT | Performed by: FAMILY MEDICINE

## 2024-02-01 PROCEDURE — 99396 PREV VISIT EST AGE 40-64: CPT | Performed by: FAMILY MEDICINE

## 2024-02-01 NOTE — PATIENT INSTRUCTIONS
Fasting blood work   Consider increasing citalopram to 40mg daily     Wellness Visit for Adults   AMBULATORY CARE:   A wellness visit  is when you see your healthcare provider to get screened for health problems. Your healthcare provider will also give you advice on how to stay healthy. Write down your questions so you remember to ask them. Ask your healthcare provider how often you should have a wellness visit.  What happens at a wellness visit:  Your healthcare provider will ask about your health, and your family history of health problems. This includes high blood pressure, heart disease, and cancer. He or she will ask if you have symptoms that concern you, if you smoke, and about your mood. You may also be asked about your intake of medicines, supplements, food, and alcohol. Any of the following may be done:  Your weight  will be checked. Your height may also be checked so your body mass index (BMI) can be calculated. Your BMI shows if you are at a healthy weight.    Your blood pressure  and heart rate will be checked. Your temperature may also be checked.    Blood and urine tests  may be done. Blood tests may be done to check your cholesterol levels. Abnormal cholesterol levels increase your risk for heart disease and stroke. You may also need a blood or urine test to check for diabetes if you are at increased risk. Urine tests may be done to look for signs of an infection or kidney disease.    A physical exam  includes checking your heartbeat and lungs with a stethoscope. Your healthcare provider may also check your skin to look for sun damage.    Screening tests  may be recommended. A screening test is done to check for diseases that may not cause symptoms. The screening tests you may need depend on your age, gender, family history, and lifestyle habits. For example, colorectal screening may be recommended if you are 50 years old or older.    Screening tests you need if you are a woman:   A Pap smear  is used  to screen for cervical cancer. Pap smears are usually done every 3 to 5 years depending on your age. You may need them more often if you have had abnormal Pap smear test results in the past. Ask your healthcare provider how often you should have a Pap smear.    A mammogram  is an x-ray of your breasts to screen for breast cancer. Experts recommend mammograms every 2 years starting at age 50 years. You may need a mammogram at age 49 years or younger if you have an increased risk for breast cancer. Talk to your healthcare provider about when you should start having mammograms and how often you need them.    Vaccines you may need:   Get an influenza vaccine  every year. The influenza vaccine protects you from the flu. Several types of viruses cause the flu. The viruses change over time, so new vaccines are made each year.    Get a tetanus-diphtheria (Td) booster vaccine  every 10 years. This vaccine protects you against tetanus and diphtheria. Tetanus is a severe infection that may cause painful muscle spasms and lockjaw. Diphtheria is a severe bacterial infection that causes a thick covering in the back of your mouth and throat.    Get a human papillomavirus (HPV) vaccine  if you are female and aged 19 to 26 or male 19 to 21 and never received it. This vaccine protects you from HPV infection. HPV is the most common infection spread by sexual contact. HPV may also cause vaginal, penile, and anal cancers.    Get a pneumococcal vaccine  if you are aged 65 years or older. The pneumococcal vaccine is an injection given to protect you from pneumococcal disease. Pneumococcal disease is an infection caused by pneumococcal bacteria. The infection may cause pneumonia, meningitis, or an ear infection.    Get a shingles vaccine  if you are 60 or older, even if you have had shingles before. The shingles vaccine is an injection to protect you from the varicella-zoster virus. This is the same virus that causes chickenpox. Shingles  is a painful rash that develops in people who had chickenpox or have been exposed to the virus.    How to eat healthy:  My Plate is a model for planning healthy meals. It shows the types and amounts of foods that should go on your plate. Fruits and vegetables make up about half of your plate, and grains and protein make up the other half. A serving of dairy is included on the side of your plate. The amount of calories and serving sizes you need depends on your age, gender, weight, and height. Examples of healthy foods are listed below:  Eat a variety of vegetables  such as dark green, red, and orange vegetables. You can also include canned vegetables low in sodium (salt) and frozen vegetables without added butter or sauces.    Eat a variety of fresh fruits , canned fruit in 100% juice, frozen fruit, and dried fruit.    Include whole grains.  At least half of the grains you eat should be whole grains. Examples include whole-wheat bread, wheat pasta, brown rice, and whole-grain cereals such as oatmeal.    Eat a variety of protein foods such as seafood (fish and shellfish), lean meat, and poultry without skin (turkey and chicken). Examples of lean meats include pork leg, shoulder, or tenderloin, and beef round, sirloin, tenderloin, and extra lean ground beef. Other protein foods include eggs and egg substitutes, beans, peas, soy products, nuts, and seeds.    Choose low-fat dairy products such as skim or 1% milk or low-fat yogurt, cheese, and cottage cheese.    Limit unhealthy fats  such as butter, hard margarine, and shortening.       Exercise:  Exercise at least 30 minutes per day on most days of the week. Some examples of exercise include walking, biking, dancing, and swimming. You can also fit in more physical activity by taking the stairs instead of the elevator or parking farther away from stores. Include muscle strengthening activities 2 days each week. Regular exercise provides many health benefits. It helps you  manage your weight, and decreases your risk for type 2 diabetes, heart disease, stroke, and high blood pressure. Exercise can also help improve your mood. Ask your healthcare provider about the best exercise plan for you.       General health and safety guidelines:   Do not smoke.  Nicotine and other chemicals in cigarettes and cigars can cause lung damage. Ask your healthcare provider for information if you currently smoke and need help to quit. E-cigarettes or smokeless tobacco still contain nicotine. Talk to your healthcare provider before you use these products.    Limit alcohol.  A drink of alcohol is 12 ounces of beer, 5 ounces of wine, or 1½ ounces of liquor.    Lose weight, if needed.  Being overweight increases your risk of certain health conditions. These include heart disease, high blood pressure, type 2 diabetes, and certain types of cancer.    Protect your skin.  Do not sunbathe or use tanning beds. Use sunscreen with a SPF 15 or higher. Apply sunscreen at least 15 minutes before you go outside. Reapply sunscreen every 2 hours. Wear protective clothing, hats, and sunglasses when you are outside.    Drive safely.  Always wear your seatbelt. Make sure everyone in your car wears a seatbelt. A seatbelt can save your life if you are in an accident. Do not use your cell phone when you are driving. This could distract you and cause an accident. Pull over if you need to make a call or send a text message.    Practice safe sex.  Use latex condoms if are sexually active and have more than one partner. Your healthcare provider may recommend screening tests for sexually transmitted infections (STIs).    Wear helmets, lifejackets, and protective gear.  Always wear a helmet when you ride a bike or motorcycle, go skiing, or play sports that could cause a head injury. Wear protective equipment when you play sports. Wear a lifejacket when you are on a boat or doing water sports.    © Copyright [employer] 2023 Information  is for End User's use only and may not be sold, redistributed or otherwise used for commercial purposes.  The above information is an  only. It is not intended as medical advice for individual conditions or treatments. Talk to your doctor, nurse or pharmacist before following any medical regimen to see if it is safe and effective for you.

## 2024-02-01 NOTE — PROGRESS NOTES
ADULT ANNUAL PHYSICAL  [facility]    NAME: Eliana Hawk  AGE: 43 y.o. SEX: female  : 1980     DATE: 2/3/2024     Assessment and Plan:     1. Well adult exam    2. Hashimoto's thyroiditis  Assessment & Plan:  Repeat thyroid testing, currently not taking thryroid supplement.       3. Recurrent major depressive disorder, in partial remission (HCC)  Assessment & Plan:  Citalopram 30mg daily, will get blood work done and consider increasing to 40mg daily       4. Perimenopause  Assessment & Plan:  With mood changes, consider increasing citalopram to 40mg dialy     Orders:  -     Estrogens, total; Future  -     Progesterone; Future  -     Estrogens, total  -     Progesterone    5. Hyperbilirubinemia  -     Comprehensive metabolic panel; Future  -     Comprehensive metabolic panel    6. Hyponatremia  -     Comprehensive metabolic panel; Future  -     Comprehensive metabolic panel    7. Chronic fatigue  -     CBC and differential; Future  -     Estrogens, total; Future  -     Progesterone; Future  -     Anti-microsomal antibody; Future  -     Iron; Future  -     CBC and differential  -     Estrogens, total  -     Progesterone  -     Anti-microsomal antibody  -     Iron    8. Screening for thyroid disorder  -     TSH, 3rd generation with Free T4 reflex; Future  -     TSH, 3rd generation with Free T4 reflex    9. Screening for endocrine disorder  -     Comprehensive metabolic panel; Future  -     CBC and differential; Future  -     Anti-microsomal antibody; Future  -     Comprehensive metabolic panel  -     CBC and differential  -     Anti-microsomal antibody    10. Screening, anemia, deficiency, iron  -     Comprehensive metabolic panel; Future  -     CBC and differential; Future  -     Iron; Future  -     Comprehensive metabolic panel  -     CBC and differential  -     Iron    11. Screening, lipid  -     Lipid panel; Future  -     Lipid  panel        Immunizations and preventive care screenings were discussed with patient today. Appropriate education was printed on patient's after visit summary.    Counseling:  Dental Health: discussed importance of regular tooth brushing, flossing, and dental visits.  Exercise: the importance of regular exercise/physical activity was discussed. Recommend exercise 3-5 times per week for at least 30 minutes.       Depression Screening and Follow-up Plan: Patient's depression screening was positive with a PHQ-9 score of 10. Patient assessed for underlying major depression. Brief counseling provided and recommend additional follow-up/re-evaluation next office visit.         No follow-ups on file.     Chief Complaint:     Chief Complaint   Patient presents with    Physical Exam     Yearly physical , hormone       History of Present Illness:     Pt is seen for a physical today. Complains of pms. Periods now longer in between cycles. was 30 days, now usually 17-21 days.             Review of Systems:     Review of Systems   Constitutional: Negative.  Negative for fatigue and fever.   HENT: Negative.     Eyes: Negative.    Respiratory: Negative.  Negative for cough.    Cardiovascular: Negative.    Gastrointestinal: Negative.    Endocrine: Negative.    Genitourinary: Negative.    Musculoskeletal: Negative.    Skin: Negative.    Allergic/Immunologic: Negative.    Neurological: Negative.    Psychiatric/Behavioral:  Positive for dysphoric mood. The patient is nervous/anxious.       Past Medical History:     Past Medical History:   Diagnosis Date    Anxiety     COVID-19 virus infection     Depression     Disease of thyroid gland     Hashimoto's    Gall stones     Gestational diabetes mellitus, class A1     Last Assessed: 04 Feb 2016    H/O diagnostic mammography 02/26/2021    Bi-Rads 3. Probable asymmetric breast tissue in the outer left breast. Left diagnostic mammois recommended in 6 months to demonstrate stability. Left  breast US 2/26/2021-Probable asymmetric breast tissue in the outer left breast. Left diagnostic mammo is recommended in 6 months to demonstrat stability    Hashimoto's disease     History of HPV infection     History of posttraumatic stress disorder (PTSD)     History of screening mammography 02/17/2021    Bi-Rads 0    Migraine     Last Assessed: 22 Jan 2014    Miscarriage     Umbilical hernia       Past Surgical History:     Past Surgical History:   Procedure Laterality Date    CERVICAL CONIZATION   W/ LASER      CHOLECYSTECTOMY      EGD AND COLONOSCOPY N/A 09/25/2018    Procedure: EGD AND COLONOSCOPY;  Surgeon: Stephen Kamara MD;  Location: [facility];  Service: Gastroenterology    GENPATH [facility]E BIOPSY/LEEP (HISTORICAL)  2007    HERNIA REPAIR      MAMMO (HISTORICAL)      [facility]    M[facility]MO (H[facility]ORICAL)  2021    [facility]    MULTIPLE TOOTH EXTRACTIONS      OK LAPAROSCOPY SURG CHOLECYSTECTOMY N/A 01/25/2017    Procedure: CHOLECYSTECTOMY LAPAROSCOPIC;  Surgeon: Anibal Ellison MD;  Location: [facility];  Service: General    OK LAPS SURG CHOLECYSTECTOMY W/CHOLANGIOGRAPHY N/A 01/25/2017    Procedure: CHOLANGIOGRAM;  Surgeon: Ainbal Ellison MD;  [facility];  Service: General    OK RPR UMBILICAL HRNA 5 YRS/> REDUCIBLE N/A 01/25/2017    Procedure: OPEN UMBILICAL HERNIA REPAIR ;  Surgeon: Anibal Ellison MD;  [facility]: [facility];  Service: General    TUBAL LIGATION      VAGINAL DELIVERY      X 2      Social History:     Social History     Socioeconomic History    Marital status:      Spouse name: None    Number of children: None    Years of education: None    Highest education level: None   Occupational History    None   Tobacco Use    Smoking status: Former     Passive exposure: Never    Smokeless tobacco: Never   Vaping Use    Vaping status: Never Used   Substance and Sexual Activity    Alcohol use: Yes     Alcohol/week: 10.0 standard drinks of alcohol     Types: 10 Standard drinks or equivalent per week      Comment: Per Allscripts: socially    Drug use: Yes     Types: Marijuana     Comment: medical     Sexual activity: Yes     Partners: Male     Birth control/protection: Female Sterilization     Comment: multiple partners in past year   Other Topics Concern    None   Social History Narrative    Exercise: Occassionally    Domestic violence: No    Occupation     Alchohol use socially on weekends (1-4/week)    Current illegal drug use Yes     Social Determinants of Health     Financial Resource Strain: Not on file   Food Insecurity: Not on file   Transportation Needs: Not on file   Physical Activity: Not on file   Stress: Not on file   Social Connections: Not on file   Intimate Partner Violence: Not on file   Housing Stability: Not on file      Family History:     Family History   Problem Relation Age of Onset    Thyroid disease Mother     Heart murmur Father     No Known Problems Sister     No Known Problems Daughter     No Known Problems Maternal Grandmother     Stroke Maternal Grandfather     Breast cancer Paternal Grandmother 65    Skin cancer Paternal Grandmother     Colon cancer Paternal Grandfather 55    No Known Problems Maternal Aunt     No Known Problems Maternal Aunt     Uterine cancer Neg Hx     Ovarian cancer Neg Hx       Current Medications:     Current Outpatient Medications   Medication Sig Dispense Refill    citalopram (CeleXA) 20 mg tablet TAKE ONE AND ONE-HALF TABLETS BY MOUTH EVERY  tablet 0    multivitamin (THERAGRAN) TABS Take 1 tablet by mouth daily      valACYclovir (VALTREX) 500 mg tablet Take 1 tablet (500 mg total) by mouth daily 30 tablet 13    Zinc 50 MG TABS Take by mouth      acyclovir (ZOVIRAX) 5 % ointment Apply topically every 4 (four) hours for 5 days 30 g 1     No current facility-administered medications for this visit.      Allergies:     Allergies   Allergen Reactions    Septra [Sulfamethoxazole-Trimethoprim] Rash    Sulfa Antibiotics Rash     Category:  "Allergy;       Physical Exam:     /78   Pulse 78   Temp (!) 95.6 °F (35.3 °C) (Tympanic)   Ht 5' 7.25\" (1.708 m)   Wt 65.4 kg (144 lb 1.9 oz)   LMP 01/15/2024 (Exact Date)   SpO2 96%   BMI 22.40 kg/m²     Physical Exam  Vitals and nursing note reviewed.   Constitutional:       Appearance: She is well-developed.   HENT:      Head: Normocephalic and atraumatic.      Right Ear: External ear normal.      Left Ear: External ear normal.      Nose: Nose normal.   Eyes:      Conjunctiva/sclera: Conjunctivae normal.      Pupils: Pupils are equal, round, and reactive to light.   Cardiovascular:      Rate and Rhythm: Normal rate and regular rhythm.      Heart sounds: Normal heart sounds.   Pulmonary:      Effort: Pulmonary effort is normal.      Breath sounds: Normal breath sounds.   Abdominal:      General: Bowel sounds are normal.      Palpations: Abdomen is soft.   Musculoskeletal:         General: Normal range of motion.      Cervical back: Normal range of motion and neck supple.   Skin:     General: Skin is warm and dry.   Neurological:      Mental Status: She is alert and oriented to person, place, and time.   Psychiatric:         Behavior: Behavior normal.         Thought Content: Thought content normal.         Judgment: Judgment normal.          Nicolle Saldana DO  [facility]  "

## 2024-02-03 PROBLEM — N95.1 PERIMENOPAUSE: Status: ACTIVE | Noted: 2024-02-03

## 2024-02-03 PROBLEM — U07.1 COVID-19 VIRUS INFECTION: Status: RESOLVED | Noted: 2021-04-05 | Resolved: 2024-02-03

## 2024-02-03 PROBLEM — E87.1 HYPONATREMIA: Status: ACTIVE | Noted: 2024-02-03

## 2024-02-08 ENCOUNTER — ANNUAL EXAM (OUTPATIENT)
Dept: OBGYN CLINIC | Facility: CLINIC | Age: 44
End: 2024-02-08
Payer: COMMERCIAL

## 2024-02-08 VITALS
SYSTOLIC BLOOD PRESSURE: 108 MMHG | WEIGHT: 143.2 LBS | DIASTOLIC BLOOD PRESSURE: 64 MMHG | HEIGHT: 67 IN | BODY MASS INDEX: 22.47 KG/M2

## 2024-02-08 DIAGNOSIS — R87.618 PAP SMEAR ABNORMALITY OF CERVIX/HUMAN PAPILLOMAVIRUS (HPV) POSITIVE: ICD-10-CM

## 2024-02-08 DIAGNOSIS — Z01.419 ROUTINE GYNECOLOGICAL EXAMINATION: Primary | ICD-10-CM

## 2024-02-08 DIAGNOSIS — Z12.31 ENCOUNTER FOR SCREENING MAMMOGRAM FOR MALIGNANT NEOPLASM OF BREAST: ICD-10-CM

## 2024-02-08 DIAGNOSIS — A60.04 HERPES SIMPLEX VULVOVAGINITIS: ICD-10-CM

## 2024-02-08 DIAGNOSIS — Z12.4 SCREENING FOR MALIGNANT NEOPLASM OF THE CERVIX: ICD-10-CM

## 2024-02-08 PROCEDURE — 99396 PREV VISIT EST AGE 40-64: CPT | Performed by: OBSTETRICS & GYNECOLOGY

## 2024-02-08 RX ORDER — VALACYCLOVIR HYDROCHLORIDE 500 MG/1
500 TABLET, FILM COATED ORAL DAILY
Qty: 90 TABLET | Refills: 4 | Status: SHIPPED | OUTPATIENT
Start: 2024-02-08 | End: 2024-03-09

## 2024-02-08 NOTE — PROGRESS NOTES
[facility] [place]  [address] [id number]    ASSESSMENT/PLAN: Eliana Hawk is a 43 y.o.  who presents for annual gynecologic exam.    Encounter for routine gynecologic examination  - Routine well woman exam completed today.  - Cervical Cancer Screening: Current ASCCP Guidelines reviewed. Last Pap: 12/15/2022 . Next Pap Due:   - HPV Vaccination status: Not immunized  - Contraceptive counseling discussed.  Current contraception: tubal  - Breast Cancer Screening: Last Mammogram 2024, Birads 3 - has follow up scheduled  - Colorectal cancer screening was not ordered.  - The following were reviewed in today's visit: breast self exam and mammography screening ordered    Additional problems addressed during this visit:  1. Routine gynecological examination    2. Encounter for screening mammogram for malignant neoplasm of breast  -     Mammo screening bilateral w 3d & cad; Future    3. Screening for malignant neoplasm of the cervix  -     Thinprep Tis Pap and HPV mRNA E6/E7 Reflex HPV 16,18/45    4. Pap smear abnormality of cervix/human papillomavirus (HPV) positive  -     Thinprep Tis Pap and HPV mRNA E6/E7 Reflex HPV 16,18/45    5. Herpes simplex vulvovaginitis  -     valACYclovir (VALTREX) 500 mg tablet; Take 1 tablet (500 mg total) by mouth daily        CC:  Annual Gynecologic Examination    HPI: Eliana Hawk is a 43 y.o.  who presents for annual gynecologic examination.  HPI    The following portions of the patient's history were reviewed and updated as appropriate: She  has a past medical history of Anxiety, COVID-19 virus infection, Depression, Disease of thyroid gland, Gall stones, Gestational diabetes mellitus, class A1, H/O diagnostic mammography (2021), Hashimoto's disease, Herpes (2023), History of HPV infection, History of posttraumatic stress disorder (PTSD), History of screening mammography (2021), Migraine, Miscarriage, and Umbilical  "hernia.  She  has a past surgical history that includes Cervical conization w/ laser; Multiple tooth extractions; Vaginal delivery; pr laparoscopy surg cholecystectomy (N/A, 01/25/2017); pr rpr umbilical hrna 5 yrs/> reducible (N/A, 01/25/2017); pr laps surg cholecystectomy w/cholangiography (N/A, 01/25/2017); Cholecystectomy; Hernia repair; EGD AND COLONOSCOPY (N/A, 09/25/2018); Mammo (historical); [facility] Biopsy/LEEP (Historical) (2007); Tubal ligation; and Mammo (historical) (2021).  Her family history includes Breast cancer (age of onset: 65) in her paternal grandmother; Colon cancer (age of onset: 55) in her paternal grandfather; Heart murmur in her father; No Known Problems in her daughter, maternal aunt, maternal aunt, maternal grandmother, and sister; Skin cancer in her paternal grandmother; Stroke in her maternal grandfather; Thyroid disease in her mother.  She  reports that she has quit smoking. Her smoking use included cigarettes. She has never been exposed to tobacco smoke. She has never used smokeless tobacco. She reports current alcohol use of about 10.0 standard drinks of alcohol per week. She reports current drug use. Drug: Marijuana.  Current Outpatient Medications   Medication Sig Dispense Refill    acyclovir (ZOVIRAX) 5 % ointment Apply topically every 4 (four) hours for 5 days 30 g 1    citalopram (CeleXA) 20 mg tablet TAKE ONE AND ONE-HALF TABLETS BY MOUTH EVERY  tablet 0    multivitamin (THERAGRAN) TABS Take 1 tablet by mouth daily      valACYclovir (VALTREX) 500 mg tablet Take 1 tablet (500 mg total) by mouth daily 90 tablet 4    Zinc 50 MG TABS Take by mouth       No current facility-administered medications for this visit.     She is allergic to septra [sulfamethoxazole-trimethoprim] and sulfa antibiotics..    Review of Systems      Objective:  /64 (BP Location: Left arm, Patient Position: Sitting, Cuff Size: Standard)   Ht 5' 7.25\" (1.708 m)   Wt 65 kg (143 lb 3.2 oz)   LMP " 01/15/2024 (Exact Date)   BMI 22.26 kg/m²    Physical Exam      PE:  General Appearance: alert and oriented, in no acute distress.   HEENT: PERRL, thyroid without masses or tenderness  Breast: No masses, tenderness, skin changes, nipple D/C or axillary or supraclavicular adenopathy  Abdomen: Soft, non-tender, non-distended, no masses, no rebound or guarding.  Pelvic:       External genitalia: Normal appearance, no abnormal pigmentation, no lesions or masses. Normal Bartholin's and [place]'s.      Urinary system: Urethral meatus normal, bladder non-tender.      Vaginal: normal mucosa without prolapse or lesions. Normal-appearing physiologic discharge      Cervix: Normal-appearing, well-epithelialized, no gross lesions or masses No cervical motion tenderness.      Adnexa: No adnexal masses or tenderness noted.      Uterus: Normal-sized, regular contour, midline, mobile, no uterine tenderness.  Extremities: Normal range of motion.   Skin: normal, no rash or abnormalities  Neurologic: alert, oriented x3  Psychiatric: Appropriate affect, mood stable, cooperative with exam.

## 2024-02-13 LAB
CLINICAL INFO: ABNORMAL
CYTOLOGY CMNT CVX/VAG CYTO-IMP: ABNORMAL
DATE PREVIOUS BX: ABNORMAL
GEN CATEG CVX/VAG CYTO-IMP: ABNORMAL
HPV E6+E7 MRNA CVX QL NAA+PROBE: DETECTED
LMP START DATE: ABNORMAL
SL AMB PREV. PAP:: ABNORMAL
SPECIMEN SOURCE CVX/VAG CYTO: ABNORMAL

## 2024-02-21 PROBLEM — Z00.00 WELL ADULT EXAM: Status: RESOLVED | Noted: 2019-02-01 | Resolved: 2024-02-21

## 2024-03-05 ENCOUNTER — PROCEDURE VISIT (OUTPATIENT)
Dept: OBGYN CLINIC | Facility: CLINIC | Age: 44
End: 2024-03-05

## 2024-03-05 VITALS
BODY MASS INDEX: 23.04 KG/M2 | SYSTOLIC BLOOD PRESSURE: 106 MMHG | WEIGHT: 146.8 LBS | HEIGHT: 67 IN | DIASTOLIC BLOOD PRESSURE: 62 MMHG

## 2024-03-05 DIAGNOSIS — Z32.02 ENCOUNTER FOR PREGNANCY TEST WITH RESULT NEGATIVE: Primary | ICD-10-CM

## 2024-03-05 LAB — SL AMB POCT URINE HCG: NEGATIVE

## 2024-03-05 NOTE — PROGRESS NOTES
Here for colp PAP ASCUS-H + HPV prior pap 12/2022 normal pap + other HPV H/o Cervical cone with laser 2007      Colposcopy     Date/Time  3/5/2024 10:00 AM     [facility] Protocol   Procedure performed by:  Consent: Verbal consent obtained.  Consent given by: patient  Patient understanding: patient states understanding of the procedure being performed  Patient identity confirmed: verbally with patient     Performed by  ROWENA Lr   Authorized by  ROWENA Lr     Pre-procedure details      Prepped with: acetic acid     Indication    ASC-H   Procedure Details   Procedure: Colposcopy w/ cervical biopsy and ECC      Under satisfactory analgesia the patient was prepped and draped in the dorsal lithotomy position: yes      [place] speculum was placed in the vagina: yes      Under colposcopic examination the transition zone was seen in entirety: yes      Endocervix was curetted using a Kevorkian curette: yes      Cervical biopsy performed with a cervical biopsy punch: yes      Monsel's solution was applied: yes      Biopsy(s): yes      Location:  12:00    Specimen to pathology: yes     Post-procedure      Findings: White epithelium      Impression: Low grade cervical dysplasia      Patient tolerance of procedure:  Tolerated well, no immediate complications   Comments       Take ibuprofen 4-6 hours from last dose with food for cramping. Light vaginal bleeding with a slight brown or black color (coffee ground appearance) is normal for several days. Call office with vaginal bleeding heavier than a normal menses. No sexual activity x 5-7 days.

## 2024-03-08 LAB
CLINICAL INFO: NORMAL
PATH REPORT.COMMENTS IMP SPEC: NORMAL
SPECIMEN SOURCE: NORMAL
SPECIMEN SOURCE: NORMAL

## 2024-03-12 ENCOUNTER — TELEPHONE (OUTPATIENT)
Age: 44
End: 2024-03-12

## 2024-03-14 ENCOUNTER — OFFICE VISIT (OUTPATIENT)
Dept: OBGYN CLINIC | Facility: CLINIC | Age: 44
End: 2024-03-14
Payer: COMMERCIAL

## 2024-03-14 VITALS
WEIGHT: 141.6 LBS | HEIGHT: 67 IN | SYSTOLIC BLOOD PRESSURE: 108 MMHG | BODY MASS INDEX: 22.22 KG/M2 | DIASTOLIC BLOOD PRESSURE: 66 MMHG

## 2024-03-14 DIAGNOSIS — D06.9 SEVERE CERVICAL DYSPLASIA: Primary | ICD-10-CM

## 2024-03-14 PROCEDURE — 99214 OFFICE O/P EST MOD 30 MIN: CPT | Performed by: OBSTETRICS & GYNECOLOGY

## 2024-03-14 NOTE — PROGRESS NOTES
[facility]  [address] [place] [address]    Assessment/Plan:  1. Severe cervical dysplasia  Assessment & Plan:  Last year her pap smear was normal with HR HPV (negative 16,18/45) and this year the pap was ASCUS-H. Colposcopy shows a lesion at 12 o'clock. The biopsy suggested CIN3 and a LEEP is recommended to treat. She has had a LEEP approximately 20 years ago. Explained procedure to patient including risk and benefits, as well as alternatives. Reviewed surgical and recovery expectations. Questions answered and pt desires to proceed with surgery. Consent obtained.            Subjective:   Eliana Hawk is a 44 y.o.  .  CC:   Chief Complaint   Patient presents with    Consult       HPI: see above    ROS: Negative except as noted in HPI    Patient's last menstrual period was 2024 (approximate).       She  reports being sexually active and has had partner(s) who are male. She reports using the following methods of birth control/protection: Female Sterilization and Post-menopausal.       The following portions of the patient's history were reviewed and updated as appropriate:   Past Medical History:   Diagnosis Date    Anxiety     COVID-19 virus infection     Depression     Disease of thyroid gland     Hashimoto's    Gall stones     Gestational diabetes mellitus, class A1     Last Assessed: 2016    H/O diagnostic mammography 2021    Bi-Rads 3. Probable asymmetric breast tissue in the outer left breast. Left diagnostic mammois recommended in 6 months to demonstrate stability. Left breast US 2021-Probable asymmetric breast tissue in the outer left breast. Left diagnostic mammo is recommended in 6 months to demonstrat stability    Hashimoto's disease     Herpes 2023    History of HPV infection     History of posttraumatic stress disorder (PTSD)     History of screening mammography 2021    Bi-Rads 0    Migraine     Last Assessed: 2014     Miscarriage     Umbilical hernia      Past Surgical History:   Procedure Laterality Date    CERVICAL CONIZATION   W/ LASER      CHOLECYSTECTOMY      EGD AND COLONOSCOPY N/A 09/25/2018    Procedure: EGD AND COLONOSCOPY;  Surgeon: Stephen Kamara MD;  Location: [facility];  Service: Gastroenterology    GENPATH CONE BIOPSY/LEEP (HISTORICAL)  2007    HERNIA REPAIR      MAMMO (HISTORICAL)      [facility]    MAMMO (HISTORICAL)  2021    [facility]    MULTIPLE TOOTH EXTRACTIONS      RI LAPAROSCOPY SURG CHOLECYSTECTOMY N/A 01/25/2017    Procedure: CHOLECYSTECTOMY LAPAROSCOPIC;  Surgeon: Anibal Ellison MD;  Location:  MAIN OR;  Service: General    RI LAPS SURG CHOLECYSTECTOMY W/CHOLANGIOGRAPHY N/A 01/25/2017    Procedure: CHOLANGIOGRAM;  Surgeon: Anibal Ellison MD;  [facility];  Service: General    RI RPR UMBILICAL HRNA 5 YRS/> REDUCIBLE N/A 01/25/2017    Procedure: OPEN UMBILICAL HERNIA REPAIR ;  Surgeon: Anibal Ellison MD;  [facility]: [facility];  Service: General    TUBAL LIGATION      VAGINAL DELIVERY      X 2     Family History   Problem Relation Age of Onset    Thyroid disease Mother     Heart murmur Father     No Known Problems Sister     No Known Problems Daughter     No Known Problems Maternal Grandmother     Stroke Maternal Grandfather     Breast cancer Paternal Grandmother 65    Skin cancer Paternal Grandmother     Colon cancer Paternal Grandfather 55    No Known Problems Maternal Aunt     No Known Problems Maternal Aunt     Uterine cancer Neg Hx     Ovarian cancer Neg Hx      Social History     Socioeconomic History    Marital status:      Spouse name: Not on file    Number of children: Not on file    Years of education: Not on file    Highest education level: Not on file   Occupational History    Not on file   Tobacco Use    Smoking status: Former     Current packs/day: 0.00     Types: Cigarettes     Passive exposure: Never    Smokeless tobacco: Never   Vaping Use    Vaping status: Never Used  "  Substance and Sexual Activity    Alcohol use: Yes     Alcohol/week: 10.0 standard drinks of alcohol     Types: 10 Standard drinks or equivalent per week     Comment: Per Allscripts: socially    Drug use: Yes     Types: Marijuana     Comment: medical     Sexual activity: Yes     Partners: Male     Birth control/protection: Female Sterilization, Post-menopausal     Comment: multiple partners in past year   Other Topics Concern    Not on file   Social History Narrative    Exercise: Occassionally    Domestic violence: No    Occupation     Alchohol use socially on weekends (1-4/week)    Current illegal drug use Yes     Social Determinants of Health     Financial Resource Strain: Not on file   Food Insecurity: Not on file   Transportation Needs: Not on file   Physical Activity: Not on file   Stress: Not on file   Social Connections: Not on file   Intimate Partner Violence: Not on file   Housing Stability: Not on file     Outpatient Medications Marked as Taking for the 3/14/24 encounter (Office Visit) with Saad Jamison MD   Medication    acyclovir (ZOVIRAX) 5 % ointment    citalopram (CeleXA) 20 mg tablet    multivitamin (THERAGRAN) TABS    valACYclovir (VALTREX) 500 mg tablet    Zinc 50 MG TABS     Allergies   Allergen Reactions    Septra [Sulfamethoxazole-Trimethoprim] Rash    Sulfa Antibiotics Rash     Category: Allergy;            Objective:  /66 (BP Location: Left arm, Patient Position: Sitting, Cuff Size: Standard)   Ht 5' 7.25\" (1.708 m)   Wt 64.2 kg (141 lb 9.6 oz)   LMP 03/02/2024 (Approximate)   Breastfeeding No   BMI 22.01 kg/m²            General Appearance: alert and oriented, in no acute distress.   Heart: RRR  Lungs: CTA  Abdomen: Soft, non-tender, non-distended, no masses, no rebound or guarding.  Pelvic:       External genitalia: Normal appearance, no abnormal pigmentation, no lesions or masses. Normal Bartholin's and [place]'s.      Urinary system: Urethral meatus normal, bladder " non-tender.      Vaginal: normal mucosa without prolapse or lesions. Normal-appearing physiologic discharge.      Cervix: Normal-appearing, well-epithelialized, no gross lesions or masses. No cervical motion tenderness.      Adnexa: No adnexal masses or tenderness noted.      Uterus: Normal-sized, regular contour, midline, mobile, no uterine tenderness.   Extremities: Normal range of motion.   Skin: normal, no rash or abnormalities  Neurologic: alert, oriented x3  Psychiatric: Appropriate affect, mood stable, cooperative with exam.        Saad Jamison MD  3/14/2024 4:10 PM

## 2024-03-14 NOTE — H&P
[facility] OB/GYN [facility]  [address] [place] [address]    Assessment/Plan:  1. Severe cervical dysplasia  Assessment & Plan:  Last year her pap smear was normal with HR HPV (negative 16,18/45) and this year the pap was ASCUS-H. Colposcopy shows a lesion at 12 o'clock. The biopsy suggested CIN3 and a LEEP is recommended to treat. She has had a LEEP approximately 20 years ago. Explained procedure to patient including risk and benefits, as well as alternatives. Reviewed surgical and recovery expectations. Questions answered and pt desires to proceed with surgery. Consent obtained.            Subjective:   Eliana Hawk is a 44 y.o.  .  CC:   Chief Complaint   Patient presents with    Consult       HPI: see above    ROS: Negative except as noted in HPI    Patient's last menstrual period was 2024 (approximate).       She  reports being sexually active and has had partner(s) who are male. She reports using the following methods of birth control/protection: Female Sterilization and Post-menopausal.       The following portions of the patient's history were reviewed and updated as appropriate:   Past Medical History:   Diagnosis Date    Anxiety     COVID-19 virus infection     Depression     Disease of thyroid gland     Hashimoto's    Gall stones     Gestational diabetes mellitus, class A1     Last Assessed: 2016    H/O diagnostic mammography 2021    Bi-Rads 3. Probable asymmetric breast tissue in the outer left breast. Left diagnostic mammois recommended in 6 months to demonstrate stability. Left breast US 2021-Probable asymmetric breast tissue in the outer left breast. Left diagnostic mammo is recommended in 6 months to demonstrat stability    Hashimoto's disease     Herpes 2023    History of HPV infection     History of posttraumatic stress disorder (PTSD)     History of screening mammography 2021    Bi-Rads 0    Migraine     Last Assessed: 2014     Miscarriage     Umbilical hernia      Past Surgical History:   Procedure Laterality Date    CERVICAL CONIZATION   W/ LASER      CHOLECYSTECTOMY      EGD AND COLONOSCOPY N/A 09/25/2018    Procedure: EGD AND COLONOSCOPY;  Surgeon: Stephen Kamara MD;  Location: [facility] GI LAB;  Service: Gastroenterology    GENPATH CONE BIOPSY/LEEP (HISTORICAL)  2007    HERNIA REPAIR      MAMMO (HISTORICAL)      [facility]    M[facility]MO (HISTORICAL)  2021    [facility]    MULTIPLE TOOTH EXTRACTIONS      AL LAPAROSCOPY SURG CHOLECYSTECTOMY N/A 01/25/2017    Procedure: CHOLECYSTECTOMY LAPAROSCOPIC;  Surgeon: Anibal Ellison MD;  Location:  MAIN OR;  Service: General    AL LAPS SURG CHOLECYSTECTOMY W/CHOLANGIOGRAPHY N/A 01/25/2017    Procedure: CHOLANGIOGRAM;  Surgeon: Anibal Ellison MD;  [facility]: [facility];  Service: General    AL RPR UMBILICAL HRNA 5 YRS/> REDUCIBLE N/A 01/25/2017    Procedure: OPEN UMBILICAL HERNIA REPAIR ;  Surgeon: Anibal Ellison MD;  Location: [facility]IN [facility];  Service: General    TUBAL LIGATION      VAGINAL DELIVERY      X 2     Family History   Problem Relation Age of Onset    Thyroid disease Mother     Heart murmur Father     No Known Problems Sister     No Known Problems Daughter     No Known Problems Maternal Grandmother     Stroke Maternal Grandfather     Breast cancer Paternal Grandmother 65    Skin cancer Paternal Grandmother     Colon cancer Paternal Grandfather 55    No Known Problems Maternal Aunt     No Known Problems Maternal Aunt     Uterine cancer Neg Hx     Ovarian cancer Neg Hx      Social History     Socioeconomic History    Marital status:      Spouse name: Not on file    Number of children: Not on file    Years of education: Not on file    Highest education level: Not on file   Occupational History    Not on file   Tobacco Use    Smoking status: Former     Current packs/day: 0.00     Types: Cigarettes     Passive exposure: Never    Smokeless tobacco: Never   Vaping Use    Vaping status: Never Used  "  Substance and Sexual Activity    Alcohol use: Yes     Alcohol/week: 10.0 standard drinks of alcohol     Types: 10 Standard drinks or equivalent per week     Comment: Per [employer]: socially    Drug use: Yes     Types: Marijuana     Comment: medical     Sexual activity: Yes     Partners: Male     Birth control/protection: Female Sterilization, Post-menopausal     Comment: multiple partners in past year   Other Topics Concern    Not on file   Social History Narrative    Exercise: Occassionally    Domestic violence: No    Occupation     Alchohol use socially on weekends (1-4/week)    Current illegal drug use Yes     Social Determinants of Health     Financial Resource Strain: Not on file   Food Insecurity: Not on file   Transportation Needs: Not on file   Physical Activity: Not on file   Stress: Not on file   Social Connections: Not on file   Intimate Partner Violence: Not on file   Housing Stability: Not on file     Outpatient Medications Marked as Taking for the 3/14/24 encounter (Office Visit) with Saad Jamison MD   Medication    acyclovir (ZOVIRAX) 5 % ointment    citalopram (CeleXA) 20 mg tablet    multivitamin (THERAGRAN) TABS    valACYclovir (VALTREX) 500 mg tablet    Zinc 50 MG TABS     Allergies   Allergen Reactions    Septra [Sulfamethoxazole-Trimethoprim] Rash    Sulfa Antibiotics Rash     Category: Allergy;            Objective:  /66 (BP Location: Left arm, Patient Position: Sitting, Cuff Size: Standard)   Ht 5' 7.25\" (1.708 m)   Wt 64.2 kg (141 lb 9.6 oz)   LMP 03/02/2024 (Approximate)   Breastfeeding No   BMI 22.01 kg/m²            General Appearance: alert and oriented, in no acute distress.   Heart: RRR  Lungs: CTA  Abdomen: Soft, non-tender, non-distended, no masses, no rebound or guarding.  Pelvic:       External genitalia: Normal appearance, no abnormal pigmentation, no lesions or masses. Normal Bartholin's and [place]'s.      Urinary system: Urethral meatus normal, bladder " non-tender.      Vaginal: normal mucosa without prolapse or lesions. Normal-appearing physiologic discharge.      Cervix: Normal-appearing, well-epithelialized, no gross lesions or masses. No cervical motion tenderness.      Adnexa: No adnexal masses or tenderness noted.      Uterus: Normal-sized, regular contour, midline, mobile, no uterine tenderness.   Extremities: Normal range of motion.   Skin: normal, no rash or abnormalities  Neurologic: alert, oriented x3  Psychiatric: Appropriate affect, mood stable, cooperative with exam.        Saad Jamison MD  3/14/2024 4:10 PM

## 2024-03-14 NOTE — H&P (VIEW-ONLY)
[facility] OB/GYN [facility]  [address] [place] [address]    Assessment/Plan:  1. Severe cervical dysplasia  Assessment & Plan:  Last year her pap smear was normal with HR HPV (negative 16,18/45) and this year the pap was ASCUS-H. Colposcopy shows a lesion at 12 o'clock. The biopsy suggested CIN3 and a LEEP is recommended to treat. She has had a LEEP approximately 20 years ago. Explained procedure to patient including risk and benefits, as well as alternatives. Reviewed surgical and recovery expectations. Questions answered and pt desires to proceed with surgery. Consent obtained.            Subjective:   Eliana Hawk is a 44 y.o.  .  CC:   Chief Complaint   Patient presents with    Consult       HPI: see above    ROS: Negative except as noted in HPI    Patient's last menstrual period was 2024 (approximate).       She  reports being sexually active and has had partner(s) who are male. She reports using the following methods of birth control/protection: Female Sterilization and Post-menopausal.       The following portions of the patient's history were reviewed and updated as appropriate:   Past Medical History:   Diagnosis Date    Anxiety     COVID-19 virus infection     Depression     Disease of thyroid gland     Hashimoto's    Gall stones     Gestational diabetes mellitus, class A1     Last Assessed: 2016    H/O diagnostic mammography 2021    Bi-Rads 3. Probable asymmetric breast tissue in the outer left breast. Left diagnostic mammois recommended in 6 months to demonstrate stability. Left breast US 2021-Probable asymmetric breast tissue in the outer left breast. Left diagnostic mammo is recommended in 6 months to demonstrat stability    Hashimoto's disease     Herpes 2023    History of HPV infection     History of posttraumatic stress disorder (PTSD)     History of screening mammography 2021    Bi-Rads 0    Migraine     Last Assessed: 2014     Miscarriage     Umbilical hernia      Past Surgical History:   Procedure Laterality Date    CERVICAL CONIZATION   W/ LASER      CHOLECYSTECTOMY      EGD AND COLONOSCOPY N/A 09/25/2018    Procedure: EGD AND COLONOSCOPY;  Surgeon: Stephen Kamara MD;  Location: [facility] GI LAB;  Service: Gastroenterology    GENPATH CONE BIOPSY/LEEP (HISTORICAL)  2007    HERNIA REPAIR      MAMMO (HISTORICAL)      [facility]    MAMMO (HISTORICAL)  2021    [facility]    MULTIPLE TOOTH EXTRACTIONS      OR LAPAROSCOPY SURG CHOLECYSTECTOMY N/A 01/25/2017    Procedure: CHOLECYSTECTOMY LAPAROSCOPIC;  Surgeon: Anibal Ellison MD;  Location:  MAIN OR;  Service: General    OR LAPS SURG CHOLECYSTECTOMY W/CHOLANGIOGRAPHY N/A 01/25/2017    Procedure: CHOLANGIOGRAM;  Surgeon: Anibal Ellison MD;  [facility];  Service: General    OR RPR UMBILICAL HRNA 5 YRS/> REDUCIBLE N/A 01/25/2017    Procedure: OPEN UMBILICAL HERNIA REPAIR ;  Surgeon: Anibal Ellison MD;  [facility];  Service: General    TUBAL LIGATION      VAGINAL DELIVERY      X 2     Family History   Problem Relation Age of Onset    Thyroid disease Mother     Heart murmur Father     No Known Problems Sister     No Known Problems Daughter     No Known Problems Maternal Grandmother     Stroke Maternal Grandfather     Breast cancer Paternal Grandmother 65    Skin cancer Paternal Grandmother     Colon cancer Paternal Grandfather 55    No Known Problems Maternal Aunt     No Known Problems Maternal Aunt     Uterine cancer Neg Hx     Ovarian cancer Neg Hx      Social History     Socioeconomic History    Marital status:      Spouse name: Not on file    Number of children: Not on file    Years of education: Not on file    Highest education level: Not on file   Occupational History    Not on file   Tobacco Use    Smoking status: Former     Current packs/day: 0.00     Types: Cigarettes     Passive exposure: Never    Smokeless tobacco: Never   Vaping Use    Vaping status: Never Used  "  Substance and Sexual Activity    Alcohol use: Yes     Alcohol/week: 10.0 standard drinks of alcohol     Types: 10 Standard drinks or equivalent per week     Comment: Per Allscripts: socially    Drug use: Yes     Types: Marijuana     Comment: medical     Sexual activity: Yes     Partners: Male     Birth control/protection: Female Sterilization, Post-menopausal     Comment: multiple partners in past year   Other Topics Concern    Not on file   Social History Narrative    Exercise: Occassionally    Domestic violence: No    Occupation     Alchohol use socially on weekends (1-4/week)    Current illegal drug use Yes     Social Determinants of Health     Financial Resource Strain: Not on file   Food Insecurity: Not on file   Transportation Needs: Not on file   Physical Activity: Not on file   Stress: Not on file   Social Connections: Not on file   Intimate Partner Violence: Not on file   Housing Stability: Not on file     Outpatient Medications Marked as Taking for the 3/14/24 encounter (Office Visit) with Saad Jamison MD   Medication    acyclovir (ZOVIRAX) 5 % ointment    citalopram (CeleXA) 20 mg tablet    multivitamin (THERAGRAN) TABS    valACYclovir (VALTREX) 500 mg tablet    Zinc 50 MG TABS     Allergies   Allergen Reactions    Septra [Sulfamethoxazole-Trimethoprim] Rash    Sulfa Antibiotics Rash     Category: Allergy;            Objective:  /66 (BP Location: Left arm, Patient Position: Sitting, Cuff Size: Standard)   Ht 5' 7.25\" (1.708 m)   Wt 64.2 kg (141 lb 9.6 oz)   LMP 03/02/2024 (Approximate)   Breastfeeding No   BMI 22.01 kg/m²            General Appearance: alert and oriented, in no acute distress.   Heart: RRR  Lungs: CTA  Abdomen: Soft, non-tender, non-distended, no masses, no rebound or guarding.  Pelvic:       External genitalia: Normal appearance, no abnormal pigmentation, no lesions or masses. Normal Bartholin's and [place]'s.      Urinary system: Urethral meatus normal, bladder " non-tender.      Vaginal: normal mucosa without prolapse or lesions. Normal-appearing physiologic discharge.      Cervix: Normal-appearing, well-epithelialized, no gross lesions or masses. No cervical motion tenderness.      Adnexa: No adnexal masses or tenderness noted.      Uterus: Normal-sized, regular contour, midline, mobile, no uterine tenderness.   Extremities: Normal range of motion.   Skin: normal, no rash or abnormalities  Neurologic: alert, oriented x3  Psychiatric: Appropriate affect, mood stable, cooperative with exam.        Saad Jamison MD  3/14/2024 4:10 PM

## 2024-03-14 NOTE — ASSESSMENT & PLAN NOTE
Last year her pap smear was normal with HR HPV (negative 16,18/45) and this year the pap was ASCUS-H. Colposcopy shows a lesion at 12 o'clock. The biopsy suggested CIN3 and a LEEP is recommended to treat. She has had a LEEP approximately 20 years ago. Explained procedure to patient including risk and benefits, as well as alternatives. Reviewed surgical and recovery expectations. Questions answered and pt desires to proceed with surgery. Consent obtained.

## 2024-03-15 ENCOUNTER — TELEPHONE (OUTPATIENT)
Dept: GYNECOLOGY | Facility: CLINIC | Age: 44
End: 2024-03-15

## 2024-03-15 NOTE — TELEPHONE ENCOUNTER
----- Message from Saad Jamison MD sent at 3/14/2024  4:11 PM EDT -----  Regarding: [facility] GYN Department  Surgery Scheduling Sheet    Patient Name: Eliana Hawk  : 1980    Provider: Saad Jamison MD     Needed: no; Language: N/A    Procedure: exam under anesthesia and loop electrosurgical excision procedure    Diagnosis: FLORECITA 3    Special Needs or Equipment: none    Anesthesia: IV sedation with anesthesia    Length of stay: outpatient  Does patient have comorbid conditions that will require close perioperative monitoring prior to safe discharge: no    The patient has comorbid conditions that will require close perioperative monitoring prior to safe discharge, including N/A.   This may require acute care beyond the usual and routine recovery period. As such, inpatient admission post-operatively is expected and appropriate, and anticipated hospital length of stay will be >2 midnights.    Pre-Admission Testing Needed: no   Labs ordered: urine pregnancy test    PAT's recommended in prep for procedure ordered?: Not Indicated    Medical Clearance Needed: no; Provider: N/A    MA Form Signed (tubals/hysterectomy): Not Indicated    Surgical Drink Given: no     How many days out of work: 1 day(s)     How many days no drivin day(s)       Are other appointments needed?  no  Interval for post op appt: N/A     For Surgical Scheduler:    [place]: [facility]  Surgery Scheduled On:    Pre-op Appt:     Post op Appt:    Consult/Medical clearance appt:

## 2024-03-19 ENCOUNTER — ANESTHESIA EVENT (OUTPATIENT)
Dept: PERIOP | Facility: HOSPITAL | Age: 44
End: 2024-03-19
Payer: COMMERCIAL

## 2024-03-19 RX ORDER — PYRIDOXINE HCL (VITAMIN B6) 50 MG
50 TABLET ORAL DAILY
COMMUNITY

## 2024-03-19 NOTE — PRE-PROCEDURE INSTRUCTIONS
Pre-Surgery Instructions:   Medication Instructions    citalopram (CeleXA) 20 mg tablet Take day of surgery.    multivitamin (THERAGRAN) TABS Stop taking 7 days prior to surgery.    valACYclovir (VALTREX) 500 mg tablet Take day of surgery.    vitamin B-12 (CYANOCOBALAMIN) 50 MCG TABS Stop taking 7 days prior to surgery.    Zinc 50 MG TABS Stop taking 7 days prior to surgery.     Medication instructions for day surgery reviewed. Please use only a sip of water to take your instructed medications. Avoid all over the counter vitamins, supplements and NSAIDS for one week prior to surgery per anesthesia guidelines. Tylenol is ok to take as needed.     You will receive a call one business day prior to surgery with an arrival time and hospital directions. If your surgery is scheduled on a Monday, the hospital will be calling you on the Friday prior to your surgery. If you have not heard from anyone by 8pm, please call the hospital supervisor through the hospital  at [phone]. ([place] [phone] or [place] [phone]).    Do not eat or drink anything after midnight the night before your surgery, including candy, mints, lifesavers, or chewing gum. Do not drink alcohol 24hrs before your surgery. Try not to smoke at least 24hrs before your surgery.       Follow the pre surgery showering instructions as listed in the “My Surgical Experience Booklet” or otherwise provided by your surgeon's office. Do not use a blade to shave the surgical area 1 week before surgery. It is okay to use a clean electric clippers up to 24 hours before surgery. Do not apply any lotions, creams, including makeup, cologne, deodorant, or perfumes after showering on the day of your surgery. Do not use dry shampoo, hair spray, hair gel, or any type of hair products.     No contact lenses, eye make-up, or artificial eyelashes. Remove nail polish, including gel polish, and any artificial, gel, or acrylic nails if possible. Remove all  jewelry including rings and body piercing jewelry.     Wear causal clothing that is easy to take on and off. Consider your type of surgery.    Keep any valuables, jewelry, piercings at home. Please bring any specially ordered equipment (sling, braces) if indicated.    Arrange for a responsible person to drive you to and from the hospital on the day of your surgery. Please confirm the visitor policy for the day of your procedure when you receive your phone call with an arrival time.     Call the surgeon's office with any new illnesses, exposures, or additional questions prior to surgery.    Please reference your “My Surgical Experience Booklet” for additional information to prepare for your upcoming surgery.

## 2024-03-25 ENCOUNTER — ANESTHESIA (OUTPATIENT)
Dept: PERIOP | Facility: HOSPITAL | Age: 44
End: 2024-03-25
Payer: COMMERCIAL

## 2024-03-25 ENCOUNTER — HOSPITAL ENCOUNTER (OUTPATIENT)
Facility: HOSPITAL | Age: 44
Setting detail: OUTPATIENT SURGERY
Discharge: HOME | End: 2024-03-25
Attending: OBSTETRICS & GYNECOLOGY | Admitting: OBSTETRICS & GYNECOLOGY
Payer: COMMERCIAL

## 2024-03-25 VITALS
HEART RATE: 84 BPM | DIASTOLIC BLOOD PRESSURE: 57 MMHG | OXYGEN SATURATION: 99 % | SYSTOLIC BLOOD PRESSURE: 99 MMHG | TEMPERATURE: 97.4 F | RESPIRATION RATE: 31 BRPM | WEIGHT: 143 LBS | BODY MASS INDEX: 22.23 KG/M2

## 2024-03-25 DIAGNOSIS — D06.9 HIGH GRADE SQUAMOUS INTRAEPITHELIAL LESION (HGSIL), GRADE 3 CIN, ON BIOPSY OF CERVIX: ICD-10-CM

## 2024-03-25 PROCEDURE — 88344 IMHCHEM/IMCYTCHM EA MLT ANTB: CPT | Performed by: STUDENT IN AN ORGANIZED HEALTH CARE EDUCATION/TRAINING PROGRAM

## 2024-03-25 PROCEDURE — 88307 TISSUE EXAM BY PATHOLOGIST: CPT | Performed by: STUDENT IN AN ORGANIZED HEALTH CARE EDUCATION/TRAINING PROGRAM

## 2024-03-25 PROCEDURE — 57522 CONIZATION OF CERVIX: CPT | Performed by: OBSTETRICS & GYNECOLOGY

## 2024-03-25 RX ORDER — PROPOFOL 10 MG/ML
INJECTION, EMULSION INTRAVENOUS AS NEEDED
Status: DISCONTINUED | OUTPATIENT
Start: 2024-03-25 | End: 2024-03-25

## 2024-03-25 RX ORDER — DEXAMETHASONE SODIUM PHOSPHATE 10 MG/ML
INJECTION, SOLUTION INTRAMUSCULAR; INTRAVENOUS AS NEEDED
Status: DISCONTINUED | OUTPATIENT
Start: 2024-03-25 | End: 2024-03-25

## 2024-03-25 RX ORDER — GLYCOPYRROLATE 0.2 MG/ML
INJECTION INTRAMUSCULAR; INTRAVENOUS AS NEEDED
Status: DISCONTINUED | OUTPATIENT
Start: 2024-03-25 | End: 2024-03-25

## 2024-03-25 RX ORDER — SODIUM CHLORIDE, SODIUM LACTATE, POTASSIUM CHLORIDE, CALCIUM CHLORIDE 600; 310; 30; 20 MG/100ML; MG/100ML; MG/100ML; MG/100ML
INJECTION, SOLUTION INTRAVENOUS CONTINUOUS PRN
Status: DISCONTINUED | OUTPATIENT
Start: 2024-03-25 | End: 2024-03-25

## 2024-03-25 RX ORDER — HYDROMORPHONE HCL IN WATER/PF 6 MG/30 ML
0.2 PATIENT CONTROLLED ANALGESIA SYRINGE INTRAVENOUS
Status: DISCONTINUED | OUTPATIENT
Start: 2024-03-25 | End: 2024-03-25 | Stop reason: HOSPADM

## 2024-03-25 RX ORDER — LIDOCAINE HYDROCHLORIDE 10 MG/ML
INJECTION, SOLUTION EPIDURAL; INFILTRATION; INTRACAUDAL; PERINEURAL AS NEEDED
Status: DISCONTINUED | OUTPATIENT
Start: 2024-03-25 | End: 2024-03-25

## 2024-03-25 RX ORDER — LIDOCAINE HYDROCHLORIDE 10 MG/ML
0.5 INJECTION, SOLUTION EPIDURAL; INFILTRATION; INTRACAUDAL; PERINEURAL ONCE AS NEEDED
Status: DISCONTINUED | OUTPATIENT
Start: 2024-03-25 | End: 2024-03-25 | Stop reason: HOSPADM

## 2024-03-25 RX ORDER — ONDANSETRON 2 MG/ML
4 INJECTION INTRAMUSCULAR; INTRAVENOUS ONCE AS NEEDED
Status: DISCONTINUED | OUTPATIENT
Start: 2024-03-25 | End: 2024-03-25 | Stop reason: HOSPADM

## 2024-03-25 RX ORDER — FENTANYL CITRATE/PF 50 MCG/ML
25 SYRINGE (ML) INJECTION
Status: DISCONTINUED | OUTPATIENT
Start: 2024-03-25 | End: 2024-03-25 | Stop reason: HOSPADM

## 2024-03-25 RX ORDER — LIDOCAINE HYDROCHLORIDE AND EPINEPHRINE 10; 10 MG/ML; UG/ML
INJECTION, SOLUTION INFILTRATION; PERINEURAL AS NEEDED
Status: DISCONTINUED | OUTPATIENT
Start: 2024-03-25 | End: 2024-03-25 | Stop reason: HOSPADM

## 2024-03-25 RX ORDER — MIDAZOLAM HYDROCHLORIDE 2 MG/2ML
INJECTION, SOLUTION INTRAMUSCULAR; INTRAVENOUS AS NEEDED
Status: DISCONTINUED | OUTPATIENT
Start: 2024-03-25 | End: 2024-03-25

## 2024-03-25 RX ORDER — SODIUM CHLORIDE, SODIUM LACTATE, POTASSIUM CHLORIDE, CALCIUM CHLORIDE 600; 310; 30; 20 MG/100ML; MG/100ML; MG/100ML; MG/100ML
125 INJECTION, SOLUTION INTRAVENOUS CONTINUOUS
Status: DISCONTINUED | OUTPATIENT
Start: 2024-03-25 | End: 2024-03-25 | Stop reason: HOSPADM

## 2024-03-25 RX ORDER — FENTANYL CITRATE 50 UG/ML
INJECTION, SOLUTION INTRAMUSCULAR; INTRAVENOUS AS NEEDED
Status: DISCONTINUED | OUTPATIENT
Start: 2024-03-25 | End: 2024-03-25

## 2024-03-25 RX ORDER — ONDANSETRON 2 MG/ML
INJECTION INTRAMUSCULAR; INTRAVENOUS AS NEEDED
Status: DISCONTINUED | OUTPATIENT
Start: 2024-03-25 | End: 2024-03-25

## 2024-03-25 RX ADMIN — MIDAZOLAM 2 MG: 1 INJECTION INTRAMUSCULAR; INTRAVENOUS at 12:28

## 2024-03-25 RX ADMIN — PROPOFOL 150 MG: 10 INJECTION, EMULSION INTRAVENOUS at 12:33

## 2024-03-25 RX ADMIN — GLYCOPYRROLATE 0.1 MG: 0.2 INJECTION INTRAMUSCULAR; INTRAVENOUS at 12:42

## 2024-03-25 RX ADMIN — FENTANYL CITRATE 25 MCG: 50 INJECTION, SOLUTION INTRAMUSCULAR; INTRAVENOUS at 12:37

## 2024-03-25 RX ADMIN — LIDOCAINE HYDROCHLORIDE 60 MG: 10 INJECTION, SOLUTION EPIDURAL; INFILTRATION; INTRACAUDAL; PERINEURAL at 12:33

## 2024-03-25 RX ADMIN — DEXAMETHASONE SODIUM PHOSPHATE 10 MG: 10 INJECTION, SOLUTION INTRAMUSCULAR; INTRAVENOUS at 12:42

## 2024-03-25 RX ADMIN — SODIUM CHLORIDE, SODIUM LACTATE, POTASSIUM CHLORIDE, AND CALCIUM CHLORIDE: .6; .31; .03; .02 INJECTION, SOLUTION INTRAVENOUS at 12:31

## 2024-03-25 RX ADMIN — ONDANSETRON 4 MG: 2 INJECTION INTRAMUSCULAR; INTRAVENOUS at 12:42

## 2024-03-25 NOTE — ANESTHESIA POSTPROCEDURE EVALUATION
Post-Op Assessment Note    CV Status:  Stable  Pain Score: 0    Pain management: adequate       Mental Status:  Alert and awake   Hydration Status:  Euvolemic and stable   PONV Controlled:  Controlled   Airway Patency:  Patent and adequate     Post Op Vitals Reviewed: Yes    No anethesia notable event occurred.                BP      Temp      Pulse     Resp      SpO2

## 2024-03-25 NOTE — OP NOTE
OPERATIVE REPORT  PATIENT NAME: Eliana Hawk    :  1980  MRN: [id number]  Pt Location:  OR ROOM 04    SURGERY DATE: 3/25/2024    Surgeons and Role:     * Saad Jamison MD - Primary    Preop Diagnosis:  High grade squamous intraepithelial lesion (HGSIL), grade 3 FLORECITA, on biopsy of cervix [D06.9]    Post-Op Diagnosis Codes:     * High grade squamous intraepithelial lesion (HGSIL), grade 3 FLORECITA, on biopsy of cervix [D06.9]    Procedure(s):  BIOPSY LEEP CERVIX. EUA    Specimen(s):  ID Type Source Tests Collected by Time Destination   1 : LEEP of Cervix-tag at 12 o'clock Tissue Cervix TISSUE EXAM Saad Jamison MD 3/25/2024 1256        Estimated Blood Loss:   Minimal    Drains:  * No LDAs found *    Anesthesia Type:   IV Sedation with Anesthesia    Operative Indications:  High grade squamous intraepithelial lesion (HGSIL), grade 3 FLORECITA, on biopsy of cervix [D06.9]      Operative Findings:  Parous cervix. No lesions or masses noted    Complications:   None    Procedure and Technique:  The patient was identified by anesthesia and the operating surgeon.  She was taken to the OR  where she was placed in the supine position. IV sedation was given and she was placed in the lithotomy position. She was prepped and draped in a sterile fashion. An operative timeout was performed, confirming the patient and the procedure to be performed.    An shielded speculum and side wall retractor were placed into the vagina and the cervix was visualized. 10 ml of 1% lidocaine was injected for a paracervical block. Lugol's solution was applied to the cervix. A 20 x 12 mm loop was chosen with a 70/30 cut/coagulation blend. A single pass of the loop excised the specimen, which was tagged at 12 o'clock and handed for pathologic evaluation. Monsel's was applied to ensure hemostasis.    The procedure was terminated and all instruments were removed from the vagina. All counts were correct and the patient was allowed to awaken from anesthesia  and taken to PACU having tolerated the procedure well.     I was present for the entire procedure.    Patient Disposition:  PACU         SIGNATURE: Saad Jamison MD  DATE: March 25, 2024  TIME: 1:43 PM

## 2024-03-25 NOTE — ANESTHESIA PREPROCEDURE EVALUATION
Procedure:  BIOPSY LEEP CERVIX, EUA (Cervix)    Relevant Problems   NEURO/PSYCH   (+) Depression   (+) Generalized anxiety disorder        Physical Exam    Airway    Mallampati score: I  TM Distance: >3 FB  Neck ROM: full     Dental   No notable dental hx     Cardiovascular  Cardiovascular exam normal    Pulmonary  Pulmonary exam normal     Other Findings  post-pubertal.      Anesthesia Plan  ASA Score- 2     Anesthesia Type- IV sedation with anesthesia with ASA Monitors.         Additional Monitors:     Airway Plan:     Comment: I discussed risks (reviewed with patient on the anesthesia consent form), benefits and alternatives of monitored sedation including the possibility under sedation to have recall or mild discomfort.  .       Plan Factors-Exercise tolerance (METS): >4 METS.    Chart reviewed. EKG reviewed.   Patient summary reviewed.                  Induction- intravenous.    Postoperative Plan- Plan for postoperative opioid use.     Informed Consent- Anesthetic plan and risks discussed with patient.  I personally reviewed this patient with the CRNA. Discussed and agreed on the Anesthesia Plan with the CRNA..

## 2024-03-25 NOTE — INTERVAL H&P NOTE
H&P reviewed. After examining the patient I find no changes in the patients condition since the H&P had been written.    Vitals:    03/25/24 1209   BP: 115/56   Pulse: 71   Resp: 16   Temp: 98.7 °F (37.1 °C)   SpO2: 98%

## 2024-03-29 DIAGNOSIS — F34.1 DYSTHYMIA: ICD-10-CM

## 2024-03-29 RX ORDER — CITALOPRAM 20 MG/1
30 TABLET ORAL DAILY
Qty: 135 TABLET | Refills: 0 | Status: SHIPPED | OUTPATIENT
Start: 2024-03-29

## 2024-06-17 DIAGNOSIS — Z00.6 ENCOUNTER FOR EXAMINATION FOR NORMAL COMPARISON OR CONTROL IN CLINICAL RESEARCH PROGRAM: ICD-10-CM

## 2024-06-28 DIAGNOSIS — F34.1 DYSTHYMIA: ICD-10-CM

## 2024-06-28 RX ORDER — CITALOPRAM 20 MG/1
30 TABLET ORAL DAILY
Qty: 135 TABLET | Refills: 1 | Status: SHIPPED | OUTPATIENT
Start: 2024-06-28

## 2024-07-23 ENCOUNTER — HOSPITAL ENCOUNTER (OUTPATIENT)
Dept: ULTRASOUND IMAGING | Facility: CLINIC | Age: 44
Discharge: HOME | End: 2024-07-23
Payer: COMMERCIAL

## 2024-07-23 ENCOUNTER — HOSPITAL ENCOUNTER (OUTPATIENT)
Dept: MAMMOGRAPHY | Facility: CLINIC | Age: 44
Discharge: HOME | End: 2024-07-23
Payer: COMMERCIAL

## 2024-07-23 DIAGNOSIS — R92.8 ABNORMAL MAMMOGRAM OF RIGHT BREAST: ICD-10-CM

## 2024-07-23 DIAGNOSIS — R92.8 ABNORMAL FINDINGS ON DIAGNOSTIC IMAGING OF BREAST: ICD-10-CM

## 2024-07-23 PROCEDURE — 76642 ULTRASOUND BREAST LIMITED: CPT

## 2024-07-23 PROCEDURE — G0279 TOMOSYNTHESIS, MAMMO: HCPCS

## 2024-07-23 PROCEDURE — 77065 DX MAMMO INCL CAD UNI: CPT

## 2024-08-09 ENCOUNTER — OFFICE VISIT (OUTPATIENT)
Dept: FAMILY MEDICINE CLINIC | Facility: CLINIC | Age: 44
End: 2024-08-09
Payer: COMMERCIAL

## 2024-08-09 VITALS
BODY MASS INDEX: 22.43 KG/M2 | DIASTOLIC BLOOD PRESSURE: 64 MMHG | HEART RATE: 65 BPM | HEIGHT: 68 IN | TEMPERATURE: 97.6 F | OXYGEN SATURATION: 99 % | WEIGHT: 148 LBS | SYSTOLIC BLOOD PRESSURE: 108 MMHG

## 2024-08-09 DIAGNOSIS — F32.A DEPRESSION, UNSPECIFIED DEPRESSION TYPE: Primary | ICD-10-CM

## 2024-08-09 PROCEDURE — 99213 OFFICE O/P EST LOW 20 MIN: CPT

## 2024-08-09 RX ORDER — VENLAFAXINE HYDROCHLORIDE 37.5 MG/1
37.5 CAPSULE, EXTENDED RELEASE ORAL
Qty: 90 CAPSULE | Refills: 1 | Status: SHIPPED | OUTPATIENT
Start: 2024-08-29

## 2024-08-09 NOTE — ASSESSMENT & PLAN NOTE
-PHQ-9 score is 9 today, indicating mild depression. No suicidal/homicidal ideologies   -currently taking celexa 30mg daily but is interested in switching to effexor as her therapist suggested it may improve her symptoms more    Plan:  Will taper off celexa, by cutting back by 10mg weekly:  Today start taking celexa 20mg (1 tablet) and take this dose for 1 week (until 8/16). Then take celexa 10mg (0.5 tablet) for 1 week (until 8/23). then 1 week with no medications.   can begin effexor 37.5mg (1 tablet) daily on 8/30/24. After 1 week can increase to 75mg (2 tablets) daily if tolerating without issue

## 2024-08-09 NOTE — PROGRESS NOTES
Ambulatory Visit  Name: Eliana Hawk      : 1980      MRN: [id number]  Encounter Provider: Maria Alvarez DO  Encounter Date: 2024   Encounter department: [facility]    Assessment & Plan   1. Depression, unspecified depression type  Assessment & Plan:  -PHQ-9 score is 9 today, indicating mild depression. No suicidal/homicidal ideologies   -currently taking celexa 30mg daily but is interested in switching to effexor as her therapist suggested it may improve her symptoms more    Plan:  Will taper off celexa, by cutting back by 10mg weekly:  Today start taking celexa 20mg (1 tablet) and take this dose for 1 week (until ). Then take celexa 10mg (0.5 tablet) for 1 week (until ). then 1 week with no medications.   can begin effexor 37.5mg (1 tablet) daily on 24. After 1 week can increase to 75mg (2 tablets) daily if tolerating without issue   Orders:  -     venlafaxine (EFFEXOR-XR) 37.5 mg 24 hr capsule; Take 1 capsule (37.5 mg total) by mouth daily with breakfast Do not start before 2024.       History of Present Illness     Patient presents to discuss changes in medication. Explains she has been dealing with depression and her mental health for years, was on zoloft for many years and is now on celexa. States she does not feel like the celexa is helping as much as she would like. She see's a therapist who recommended switching to effexor for her symptoms. Complains of mostly fatigue associated with her depression. Would like to discuss discontinuing celexa and starting effexor today.        Review of Systems   Constitutional:  Negative for chills and fever.   HENT:  Negative for ear pain and sore throat.    Eyes:  Negative for pain and visual disturbance.   Respiratory:  Negative for cough and shortness of breath.    Cardiovascular:  Negative for chest pain and palpitations.   Gastrointestinal:  Negative for abdominal pain and vomiting.   Genitourinary:  Negative  "for dysuria and hematuria.   Musculoskeletal:  Negative for arthralgias and back pain.   Skin:  Negative for color change and rash.   Neurological:  Negative for seizures and syncope.   Psychiatric/Behavioral:  Positive for dysphoric mood.    All other systems reviewed and are negative.      Objective     /64 (BP Location: Left arm, Patient Position: Sitting, Cuff Size: Standard)   Pulse 65   Temp 97.6 °F (36.4 °C) (Tympanic)   Ht 5' 7.5\" (1.715 m)   Wt 67.1 kg (148 lb)   LMP 07/14/2024   SpO2 99%   BMI 22.84 kg/m²     Physical Exam  Constitutional:       General: She is not in acute distress.     Appearance: Normal appearance. She is not ill-appearing or toxic-appearing.   HENT:      Head: Normocephalic and atraumatic.      Right Ear: External ear normal.      Left Ear: External ear normal.      Nose: Nose normal.   Eyes:      Conjunctiva/sclera: Conjunctivae normal.   Cardiovascular:      Rate and Rhythm: Normal rate and regular rhythm.      Heart sounds: Normal heart sounds. No murmur heard.  Pulmonary:      Effort: Pulmonary effort is normal. No respiratory distress.      Breath sounds: Normal breath sounds. No wheezing, rhonchi or rales.   Musculoskeletal:         General: Normal range of motion.   Skin:     General: Skin is warm and dry.   Neurological:      General: No focal deficit present.      Mental Status: She is alert and oriented to person, place, and time.   Psychiatric:         Mood and Affect: Mood normal.         Behavior: Behavior normal.       Administrative Statements           "

## 2024-08-09 NOTE — PATIENT INSTRUCTIONS
-taper off of celexa: Today start taking celexa 20mg (1 tablet) and take this dose for 1 week (until 8/16). Then take celexa 10mg (0.5 tablet) for 1 week (until 8/23).   -then 1 week with no medications.   -can begin effexor 37.5mg (1 tablet) daily on 8/30/24. After 1 week can increase to 75mg (2 tablets) daily if tolerating without issue

## 2024-08-27 ENCOUNTER — NURSE TRIAGE (OUTPATIENT)
Age: 44
End: 2024-08-27

## 2024-08-27 NOTE — TELEPHONE ENCOUNTER
"Spoke with patient who reports LMP 8/8.  She reports bleeding had stopped and now started again 10 days ago, reported as spotting.  She denies any pain or other symptoms.  Appointment made for Thursday.  No further questions or concerns at this time.    Reason for Disposition   Age > 39 years with irregular or excessive bleeding    Answer Assessment - Initial Assessment Questions  1. AMOUNT: \"Describe the bleeding that you are having.\"     - SPOTTING: spotting, or pinkish / brownish mucous discharge; does not fill panti-liner or pad     - MILD:  less than 1 pad / hour; less than patient's usual menstrual bleeding    - MODERATE: 1-2 pads / hour; 1 menstrual cup every 6 hours; small-medium blood clots (e.g., pea, grape, small coin)    - SEVERE: soaking 2 or more pads/hour for 2 or more hours; 1 menstrual cup every 2 hours; bleeding not contained by pads or continuous red blood from vagina; large blood clots (e.g., golf ball, large coin)       spotting  2. ONSET: \"When did the bleeding begin?\" \"Is it continuing now?\"      10 days  3. MENSTRUAL PERIOD: \"When was the last normal menstrual period?\" \"How is this different than your period?\"      8/8   4. REGULARITY: \"How regular are your periods?\"      irregular  5. ABDOMINAL PAIN: \"Do you have any pain?\" \"How bad is the pain?\"  (e.g., Scale 1-10; mild, moderate, or severe)    - MILD (1-3): doesn't interfere with normal activities, abdomen soft and not tender to touch     - MODERATE (4-7): interferes with normal activities or awakens from sleep, tender to touch     - SEVERE (8-10): excruciating pain, doubled over, unable to do any normal activities       denies  6. PREGNANCY: \"Could you be pregnant?\" \"Are you sexually active?\" \"Did you recently give birth?\"      Denies had a tubal.  7. BREASTFEEDING: \"Are you breastfeeding?\"      denies  8. HORMONES: \"Are you taking any hormone medications, prescription or OTC?\" (e.g., birth control pills, estrogen)      denies  9. BLOOD " "THINNERS: \"Do you take any blood thinners?\" (e.g., Coumadin/warfarin, Pradaxa/dabigatran, aspirin)      denies  10. CAUSE: \"What do you think is causing the bleeding?\" (e.g., recent gyn surgery, recent gyn procedure; known bleeding disorder, cervical cancer, polycystic ovarian disease, fibroids)          unsure  11. HEMODYNAMIC STATUS: \"Are you weak or feeling lightheaded?\" If Yes, ask: \"Can you stand and walk normally?\"         denies  12. OTHER SYMPTOMS: \"What other symptoms are you having with the bleeding?\" (e.g., passed tissue, vaginal discharge, fever, menstrual-type cramps)        denies    Protocols used: Vaginal Bleeding - Abnormal-ADULT-[place]    "

## 2024-08-29 ENCOUNTER — OFFICE VISIT (OUTPATIENT)
Dept: OBGYN CLINIC | Facility: CLINIC | Age: 44
End: 2024-08-29
Payer: COMMERCIAL

## 2024-08-29 VITALS
HEIGHT: 68 IN | SYSTOLIC BLOOD PRESSURE: 114 MMHG | WEIGHT: 149 LBS | BODY MASS INDEX: 22.58 KG/M2 | DIASTOLIC BLOOD PRESSURE: 72 MMHG

## 2024-08-29 DIAGNOSIS — N92.1 MENORRHAGIA WITH IRREGULAR CYCLE: Primary | ICD-10-CM

## 2024-08-29 PROCEDURE — 99213 OFFICE O/P EST LOW 20 MIN: CPT | Performed by: NURSE PRACTITIONER

## 2024-08-29 NOTE — PROGRESS NOTES
[facility] [place]  [address] [place] [address]    Assessment/Plan:  1. Menorrhagia with irregular cycle  -     US pelvis complete non OB; Future  -     CBC and differential; Future  -     TSH+Free T4; Future  -     T3, free; Future  -     Anti-microsomal antibody; Future  -     CBC and differential  -     TSH+Free T4  -     T3, free  -     Anti-microsomal antibody  -     Chlamydia/GC amplified DNA by PCR  Discussed possible etiology of irregular bleeding, EMB if needed, available treatments.  Will call patient when ultrasound complete to discuss further.     Subjective:   Eliana Hawk is a 44 y.o.  female. Here for urgent office visit with complaints of irregular bleeding. Recent menses  lasting 3 days, heavy with clotting. Bleeding restarted on  and continues as of today, very light initially just spotting, heavier now. No pain or other concerns. Reports irregular menses the last several months, ranging every 18-40 days.     HPI:   HPI    Gyn History  Patient's last menstrual period was 2024.       Last pap smear: 2024    She  reports being sexually active and has had partner(s) who are male. She reports using the following methods of birth control/protection: Female Sterilization and Post-menopausal.       OB History      Past Medical History:  No date: Anxiety  No date: COVID-19 virus infection  No date: Depression  No date: Disease of thyroid gland      Comment:  Hashimoto's  No date: Gall stones  No date: Gestational diabetes mellitus, class A1      Comment:  Last Assessed: 2021: H/O diagnostic mammography      Comment:  Bi-Rads 3. Probable asymmetric breast tissue in the                outer left breast. Left diagnostic mammois recommended in               6 months to demonstrate stability. Left breast US                2021-Probable asymmetric breast tissue in the outer                left breast. Left diagnostic mammo is  recommended in 6                months to demonstrat stability  No date: Hashimoto's disease  2023: Herpes  No date: History of HPV infection  No date: History of posttraumatic stress disorder (PTSD)  2021: History of screening mammography      Comment:  Bi-Rads 0  No date: Migraine      Comment:  Last Assessed: 2014  No date: Miscarriage  No date: Umbilical hernia     Past Surgical History:  No date: CERVICAL CONIZATION   W/ LASER  No date: CHOLECYSTECTOMY  2018: EGD AND COLONOSCOPY; N/A      Comment:  Procedure: EGD AND COLONOSCOPY;  Surgeon: Stephen Kamara MD;  Location: [facility];  Service: Gastroenterology  : [facility] BIOPSY/LEEP (HISTORICAL)  No date: HERNIA REPAIR  No date: MAMMO (HISTORICAL)      Comment:  [facility]  : MAMMO (HISTORICAL)      Comment:  [facility]  No date: MULTIPLE TOOTH EXTRACTIONS  3/25/2024: WA CONIZATION CERVIX W/WO D&C RPR ELTRD EXC; N/A      Comment:  Procedure: BIOPSY LEEP CERVIX, EUA;  Surgeon: Saad Jamison MD;  Location: [facility];  Service: Gynecology  2017: WA LAPAROSCOPY SURG CHOLECYSTECTOMY; N/A      Comment:  Procedure: CHOLECYSTECTOMY LAPAROSCOPIC;  Surgeon:                Anibal Ellison MD;  Location: [facility];  Service:                General  2017: WA LAPS SURG CHOLECYSTECTOMY W/CHOLANGIOGRAPHY; N/A      Comment:  Procedure: CHOLANGIOGRAM;  Surgeon: Anibal Ellison MD;  Location: [facility];  Service: General  2017: WA RPR UMBILICAL HRNA 5 YRS/> REDUCIBLE; N/A      Comment:  Procedure: OPEN UMBILICAL HERNIA REPAIR ;  Surgeon:                Anibal Ellison MD;  Location: [facility];  Service:                General  No date: TUBAL LIGATION  No date: VAGINAL DELIVERY      Comment:  X 2     Social History     Tobacco Use    Smoking status: Former     Types: Cigarettes     Start date:      Quit date:      Years since quittin.6     Passive exposure: Never     "Smokeless tobacco: Never   Vaping Use    Vaping status: Never Used   Substance Use Topics    Alcohol use: Yes     Alcohol/week: 10.0 standard drinks of alcohol     Types: 10 Standard drinks or equivalent per week     Comment: Per [employer]: socially    Drug use: Yes     Types: Marijuana     Comment: medical           Current Outpatient Medications:     multivitamin (THERAGRAN) TABS, Take 1 tablet by mouth daily, Disp: , Rfl:     vitamin B-12 (CYANOCOBALAMIN) 50 MCG TABS, Take 50 mcg by mouth daily, Disp: , Rfl:     Zinc 50 MG TABS, Take by mouth, Disp: , Rfl:     acyclovir (ZOVIRAX) 5 % ointment, Apply topically every 4 (four) hours for 5 days, Disp: 30 g, Rfl: 1    valACYclovir (VALTREX) 500 mg tablet, Take 1 tablet (500 mg total) by mouth daily, Disp: 90 tablet, Rfl: 4    venlafaxine (EFFEXOR-XR) 37.5 mg 24 hr capsule, Take 1 capsule (37.5 mg total) by mouth daily with breakfast Do not start before August 29, 2024. (Patient not taking: Reported on 8/29/2024), Disp: 90 capsule, Rfl: 1    She is allergic to septra [sulfamethoxazole-trimethoprim] and sulfa antibiotics..    ROS: Review of Systems see HPI for details, otherwise negative    Objective:  /72 (BP Location: Left arm, Patient Position: Sitting, Cuff Size: Standard)   Ht 5' 7.5\" (1.715 m)   Wt 67.6 kg (149 lb)   LMP 08/08/2024   BMI 22.99 kg/m²      Physical Exam  Constitutional:       Appearance: Normal appearance.   HENT:      Head: Normocephalic and atraumatic.   Abdominal:      General: There is no distension.      Palpations: Abdomen is soft.      Tenderness: There is no abdominal tenderness.   Genitourinary:     General: Normal vulva.      Labia:         Right: No lesion.         Left: No lesion.       Urethra: No prolapse or urethral lesion.      Vagina: Bleeding present. No vaginal discharge or lesions.      Cervix: No cervical motion tenderness, friability or lesion.      Uterus: Normal. Not enlarged and not tender.       Adnexa: Right " adnexa normal and left adnexa normal.        Right: No mass or tenderness.          Left: No mass or tenderness.        Rectum: Normal.      Comments: Small amount dark red blood in vagina  Neurological:      Mental Status: She is alert.   Psychiatric:         Thought Content: Thought content normal.

## 2024-08-30 LAB
C TRACH RRNA SPEC QL NAA+PROBE: NOT DETECTED
N GONORRHOEA RRNA SPEC QL NAA+PROBE: NOT DETECTED

## 2024-10-25 LAB — T3FREE SERPL-MCNC: 3.2 PG/ML (ref 2.3–4.2)

## 2024-10-29 LAB
ALBUMIN SERPL-MCNC: 4.5 G/DL (ref 3.6–5.1)
ALBUMIN/GLOB SERPL: 1.6 (CALC) (ref 1–2.5)
ALP SERPL-CCNC: 57 U/L (ref 31–125)
ALT SERPL-CCNC: 11 U/L (ref 6–29)
AST SERPL-CCNC: 19 U/L (ref 10–35)
BASOPHILS # BLD AUTO: 52 CELLS/UL (ref 0–200)
BASOPHILS NFR BLD AUTO: 1.3 %
BILIRUB SERPL-MCNC: 1.3 MG/DL (ref 0.2–1.2)
BUN SERPL-MCNC: 11 MG/DL (ref 7–25)
BUN/CREAT SERPL: ABNORMAL (CALC) (ref 6–22)
CALCIUM SERPL-MCNC: 9.6 MG/DL (ref 8.6–10.2)
CHLORIDE SERPL-SCNC: 104 MMOL/L (ref 98–110)
CHOLEST SERPL-MCNC: 182 MG/DL
CHOLEST/HDLC SERPL: 1.9 (CALC)
CO2 SERPL-SCNC: 29 MMOL/L (ref 20–32)
CREAT SERPL-MCNC: 0.77 MG/DL (ref 0.5–0.99)
EOSINOPHIL # BLD AUTO: 132 CELLS/UL (ref 15–500)
EOSINOPHIL NFR BLD AUTO: 3.3 %
ERYTHROCYTE [DISTWIDTH] IN BLOOD BY AUTOMATED COUNT: 12.2 % (ref 11–15)
ESTROGEN SERPL-MCNC: 246 PG/ML
GFR/BSA.PRED SERPLBLD CYS-BASED-ARV: 96 ML/MIN/1.73M2
GLOBULIN SER CALC-MCNC: 2.8 G/DL (CALC) (ref 1.9–3.7)
GLUCOSE SERPL-MCNC: 87 MG/DL (ref 65–99)
HCT VFR BLD AUTO: 42 % (ref 35–45)
HDLC SERPL-MCNC: 95 MG/DL
HGB BLD-MCNC: 13.6 G/DL (ref 11.7–15.5)
IRON SERPL-MCNC: 80 MCG/DL (ref 40–190)
LDLC SERPL CALC-MCNC: 74 MG/DL (CALC)
LYMPHOCYTES # BLD AUTO: 1284 CELLS/UL (ref 850–3900)
LYMPHOCYTES NFR BLD AUTO: 32.1 %
MCH RBC QN AUTO: 30.2 PG (ref 27–33)
MCHC RBC AUTO-ENTMCNC: 32.4 G/DL (ref 32–36)
MCV RBC AUTO: 93.3 FL (ref 80–100)
MONOCYTES # BLD AUTO: 392 CELLS/UL (ref 200–950)
MONOCYTES NFR BLD AUTO: 9.8 %
NEUTROPHILS # BLD AUTO: 2140 CELLS/UL (ref 1500–7800)
NEUTROPHILS NFR BLD AUTO: 53.5 %
NONHDLC SERPL-MCNC: 87 MG/DL (CALC)
PLATELET # BLD AUTO: 233 THOUSAND/UL (ref 140–400)
PMV BLD REES-ECKER: 10.1 FL (ref 7.5–12.5)
POTASSIUM SERPL-SCNC: 4.1 MMOL/L (ref 3.5–5.3)
PROGEST SERPL-MCNC: 0.9 NG/ML
PROT SERPL-MCNC: 7.3 G/DL (ref 6.1–8.1)
RBC # BLD AUTO: 4.5 MILLION/UL (ref 3.8–5.1)
SODIUM SERPL-SCNC: 140 MMOL/L (ref 135–146)
THYROPEROXIDASE AB SERPL-ACNC: 45 IU/ML
TRIGL SERPL-MCNC: 54 MG/DL
TSH SERPL-ACNC: 1.61 MIU/L
WBC # BLD AUTO: 4 THOUSAND/UL (ref 3.8–10.8)

## 2024-11-04 ENCOUNTER — TELEPHONE (OUTPATIENT)
Dept: FAMILY MEDICINE CLINIC | Facility: CLINIC | Age: 44
End: 2024-11-04

## 2024-11-04 NOTE — TELEPHONE ENCOUNTER
----- Message from Nicolle Saldana DO sent at 11/3/2024 10:45 PM EST -----  Your sugar level, liver and kidney function is normal  Your bilirubin is still slightly elevated  Your cholesterol level is good.   Your blood count is normal  Your thyroid level is still normal- but your autoimmune thyroid level is still elevated.   Your estrogen and progesterone levels are not in the menopausal range  Your iron level is normal.

## 2024-11-04 NOTE — TELEPHONE ENCOUNTER
spoke to pt gave message and results from provider she asked why the bilirubin was still elevated. I told her i would put a message out to provider with any and all questions and she stated she will just google it because it has been high for a long time  I let her know if she changes her mind she can call back with any concerns

## 2024-11-15 DIAGNOSIS — A60.04 HERPES SIMPLEX VULVOVAGINITIS: ICD-10-CM

## 2024-11-15 RX ORDER — ACYCLOVIR 50 MG/G
OINTMENT TOPICAL EVERY 4 HOURS
Qty: 30 G | Refills: 0 | Status: SHIPPED | OUTPATIENT
Start: 2024-11-15 | End: 2024-11-20

## 2025-01-14 DIAGNOSIS — A60.04 HERPES SIMPLEX VULVOVAGINITIS: ICD-10-CM

## 2025-01-15 RX ORDER — VALACYCLOVIR HYDROCHLORIDE 500 MG/1
500 TABLET, FILM COATED ORAL DAILY
Qty: 30 TABLET | Refills: 5 | Status: SHIPPED | OUTPATIENT
Start: 2025-01-15 | End: 2025-07-14

## 2025-01-28 ENCOUNTER — HOSPITAL ENCOUNTER (OUTPATIENT)
Dept: MAMMOGRAPHY | Facility: CLINIC | Age: 45
Discharge: HOME | End: 2025-01-28
Payer: COMMERCIAL

## 2025-01-28 ENCOUNTER — HOSPITAL ENCOUNTER (OUTPATIENT)
Dept: ULTRASOUND IMAGING | Facility: CLINIC | Age: 45
Discharge: HOME | End: 2025-01-28
Payer: COMMERCIAL

## 2025-01-28 VITALS — HEIGHT: 68 IN | WEIGHT: 142 LBS | BODY MASS INDEX: 21.52 KG/M2

## 2025-01-28 PROCEDURE — 76642 ULTRASOUND BREAST LIMITED: CPT

## 2025-01-28 PROCEDURE — G0279 TOMOSYNTHESIS, MAMMO: HCPCS

## 2025-01-28 PROCEDURE — 77066 DX MAMMO INCL CAD BI: CPT

## 2025-02-10 ENCOUNTER — ANNUAL EXAM (OUTPATIENT)
Dept: OBGYN CLINIC | Facility: CLINIC | Age: 45
End: 2025-02-10
Payer: COMMERCIAL

## 2025-02-10 VITALS
SYSTOLIC BLOOD PRESSURE: 106 MMHG | WEIGHT: 143 LBS | BODY MASS INDEX: 21.67 KG/M2 | HEIGHT: 68 IN | DIASTOLIC BLOOD PRESSURE: 68 MMHG

## 2025-02-10 DIAGNOSIS — Z12.4 SCREENING FOR MALIGNANT NEOPLASM OF THE CERVIX: ICD-10-CM

## 2025-02-10 DIAGNOSIS — D06.9 SEVERE CERVICAL DYSPLASIA: ICD-10-CM

## 2025-02-10 DIAGNOSIS — Z01.419 ROUTINE GYNECOLOGICAL EXAMINATION: Primary | ICD-10-CM

## 2025-02-10 DIAGNOSIS — Z12.31 ENCOUNTER FOR SCREENING MAMMOGRAM FOR MALIGNANT NEOPLASM OF BREAST: ICD-10-CM

## 2025-02-10 DIAGNOSIS — A60.04 HERPES SIMPLEX VULVOVAGINITIS: ICD-10-CM

## 2025-02-10 PROCEDURE — 99396 PREV VISIT EST AGE 40-64: CPT | Performed by: OBSTETRICS & GYNECOLOGY

## 2025-02-10 RX ORDER — VALACYCLOVIR HYDROCHLORIDE 500 MG/1
1000 TABLET, FILM COATED ORAL DAILY
Qty: 180 TABLET | Refills: 4 | Status: SHIPPED | OUTPATIENT
Start: 2025-02-10 | End: 2026-02-10

## 2025-02-10 NOTE — PROGRESS NOTES
[facility] - [place] [id number] [place] [address]    ASSESSMENT/PLAN: Eliana Hawk is a 44 y.o.  who presents for annual gynecologic exam.    Encounter for routine gynecologic examination  - Routine well woman exam completed today.  - Cervical Cancer Screening: Current ASCCP Guidelines reviewed. Last Pap: 2024 . Next Pap Due: today  - HPV Vaccination status: Not immunized  - Contraceptive counseling discussed.  Current contraception: tubal  - Breast Cancer Screening: Last Mammogram 2025, Birads 3 - has follow up scheduled  - Colorectal cancer screening was not ordered.  - The following were reviewed in today's visit: breast self exam and mammography screening ordered.     Additional problems addressed during this visit:  1. Routine gynecological examination  2. Encounter for screening mammogram for malignant neoplasm of breast  -     Mammo screening bilateral w 3d and cad; Future  3. Screening for malignant neoplasm of the cervix  -     Thinprep Tis Pap and HPV mRNA E6/E7 Reflex HPV 16,18/45  4. Severe cervical dysplasia  Assessment & Plan:  Will need annual pap for at least three years.  Orders:  -     Thinprep Tis Pap and HPV mRNA E6/E7 Reflex HPV 16,18/45  5. Herpes simplex vulvovaginitis  Assessment & Plan:  Has several outbreaks a year, even on suppression. Will continue suppression and increase dose to 1 gm daily  Orders:  -     valACYclovir (VALTREX) 500 mg tablet; Take 2 tablets (1,000 mg total) by mouth daily      CC:  Annual Gynecologic Examination    HPI: Eliana Hawk is a 44 y.o.  who presents for annual gynecologic examination.  HPI    The following portions of the patient's history were reviewed and updated as appropriate: She  has a past medical history of Anxiety, COVID-19 virus infection (2021), Depression, Disease of thyroid gland, Gall stones, Gestational diabetes mellitus, class A1, H/O diagnostic mammography (2021), Hashimoto's disease,  Herpes (March 2023), History of HPV infection, History of posttraumatic stress disorder (PTSD), History of screening mammography (02/17/2021), Migraine, Miscarriage, and Umbilical hernia.  She  has a past surgical history that includes Cervical conization w/ laser; Multiple tooth extractions; Vaginal delivery; pr laparoscopy surg cholecystectomy (N/A, 01/25/2017); pr rpr umbilical hrna 5 yrs/> reducible (N/A, 01/25/2017); pr laps surg cholecystectomy w/cholangiography (N/A, 01/25/2017); Cholecystectomy; Hernia repair; EGD AND COLONOSCOPY (N/A, 09/25/2018); Mammo (historical); [facility] Biopsy/LEEP (Historical) (2007); Tubal ligation; Mammo (historical) (2021); and pr conization cervix w/wo d&c rpr eltrd exc (N/A, 3/25/2024).  Her family history includes Breast cancer (age of onset: 65) in her paternal grandmother; Colon cancer (age of onset: 55) in her paternal grandfather; Heart murmur in her father; No Known Problems in her daughter, maternal aunt, maternal aunt, maternal grandmother, and sister; Skin cancer in her paternal grandmother; Stroke in her maternal grandfather; Thyroid disease in her mother.  She  reports that she quit smoking about 29 years ago. Her smoking use included cigarettes. She started smoking about 25 years ago. She has never been exposed to tobacco smoke. She has never used smokeless tobacco. She reports current alcohol use of about 10.0 standard drinks of alcohol per week. She reports current drug use. Drug: Marijuana.  Current Outpatient Medications   Medication Sig Dispense Refill    acyclovir (ZOVIRAX) 5 % ointment Apply topically every 4 (four) hours for 5 days 30 g 0    multivitamin (THERAGRAN) TABS Take 1 tablet by mouth daily      valACYclovir (VALTREX) 500 mg tablet Take 2 tablets (1,000 mg total) by mouth daily 180 tablet 4    venlafaxine (EFFEXOR-XR) 37.5 mg 24 hr capsule Take 1 capsule (37.5 mg total) by mouth daily with breakfast Do not start before August 29, 2024. 90 capsule 1     "vitamin B-12 (CYANOCOBALAMIN) 50 MCG TABS Take 50 mcg by mouth daily      Zinc 50 MG TABS Take by mouth       No current facility-administered medications for this visit.     She is allergic to septra [sulfamethoxazole-trimethoprim] and sulfa antibiotics..    Review of Systems      Objective:  /68 (BP Location: Left arm, Patient Position: Sitting, Cuff Size: Standard)   Ht 5' 7.5\" (1.715 m)   Wt 64.9 kg (143 lb)   LMP 01/14/2025   BMI 22.07 kg/m²    Physical Exam      PE:  General Appearance: alert and oriented, in no acute distress.   HEENT: PERRL, thyroid without masses or tenderness  Breast: No masses, tenderness, skin changes, nipple D/C or axillary or supraclavicular adenopathy  Abdomen: Soft, non-tender, non-distended, no masses, no rebound or guarding.  Pelvic:       External genitalia: Normal appearance, no abnormal pigmentation, no lesions or masses. Normal Bartholin's and [place]'s.      Urinary system: Urethral meatus normal, bladder non-tender.      Vaginal: normal mucosa without prolapse or lesions. Normal-appearing physiologic discharge      Cervix: Normal-appearing, well-epithelialized, no gross lesions or masses No cervical motion tenderness.      Adnexa: No adnexal masses or tenderness noted.      Uterus: Normal-sized, regular contour, midline, mobile, no uterine tenderness.  Extremities: Normal range of motion.   Skin: normal, no rash or abnormalities  Neurologic: alert, oriented x3  Psychiatric: Appropriate affect, mood stable, cooperative with exam.  "

## 2025-02-10 NOTE — ASSESSMENT & PLAN NOTE
Has several outbreaks a year, even on suppression. Will continue suppression and increase dose to 1 gm daily

## 2025-02-12 ENCOUNTER — RESULTS FOLLOW-UP (OUTPATIENT)
Dept: FAMILY MEDICINE CLINIC | Facility: CLINIC | Age: 45
End: 2025-02-12

## 2025-02-12 DIAGNOSIS — F32.A DEPRESSION, UNSPECIFIED DEPRESSION TYPE: ICD-10-CM

## 2025-02-12 RX ORDER — VENLAFAXINE HYDROCHLORIDE 37.5 MG/1
37.5 CAPSULE, EXTENDED RELEASE ORAL
Qty: 90 CAPSULE | Refills: 1 | Status: SHIPPED | OUTPATIENT
Start: 2025-02-12 | End: 2025-02-14 | Stop reason: SDUPTHER

## 2025-02-12 NOTE — TELEPHONE ENCOUNTER
----- Message from Nicolle Saldana DO sent at 2/12/2025  2:00 PM EST -----  Regarding: effexor sent  Effexor sent to pharmacy  ----- Message -----  From: Lorena Bennett MA  Sent: 2/12/2025  11:18 AM EST  To: Nicolle Saldana DO

## 2025-02-12 NOTE — TELEPHONE ENCOUNTER
Per patient the script was wrong, she states she started taking 2 pills a day in January at the direction of her therapist. She was asking if a new script could be sent for 2 pills a day instead

## 2025-02-12 NOTE — TELEPHONE ENCOUNTER
----- Message from Nicolle Saldana DO sent at 2/12/2025 10:00 AM EST -----  The mammogram and ultrasound show cysts in both breasts - appears benign.   Repeat ultrasounds and mammogram in 1 year.

## 2025-02-14 DIAGNOSIS — F32.A DEPRESSION, UNSPECIFIED DEPRESSION TYPE: ICD-10-CM

## 2025-02-14 LAB
CLINICAL INFO: ABNORMAL
DATE PREVIOUS BX: ABNORMAL
HPV E6+E7 MRNA CVX QL NAA+PROBE: NOT DETECTED
LMP START DATE: ABNORMAL
SL AMB PREV. PAP:: ABNORMAL
SPECIMEN SOURCE CVX/VAG CYTO: ABNORMAL

## 2025-02-14 RX ORDER — VENLAFAXINE HYDROCHLORIDE 75 MG/1
75 CAPSULE, EXTENDED RELEASE ORAL
Qty: 30 CAPSULE | Refills: 0 | Status: SHIPPED | OUTPATIENT
Start: 2025-02-14

## 2025-02-15 ENCOUNTER — RESULTS FOLLOW-UP (OUTPATIENT)
Dept: OBGYN CLINIC | Facility: CLINIC | Age: 45
End: 2025-02-15

## 2025-03-18 DIAGNOSIS — F32.A DEPRESSION, UNSPECIFIED DEPRESSION TYPE: ICD-10-CM

## 2025-03-19 RX ORDER — VENLAFAXINE HYDROCHLORIDE 75 MG/1
CAPSULE, EXTENDED RELEASE ORAL
Qty: 30 CAPSULE | Refills: 0 | Status: SHIPPED | OUTPATIENT
Start: 2025-03-19

## 2025-04-21 DIAGNOSIS — F32.A DEPRESSION, UNSPECIFIED DEPRESSION TYPE: ICD-10-CM

## 2025-04-23 RX ORDER — VENLAFAXINE HYDROCHLORIDE 75 MG/1
CAPSULE, EXTENDED RELEASE ORAL
Qty: 30 CAPSULE | Refills: 2 | Status: SHIPPED | OUTPATIENT
Start: 2025-04-23

## 2025-05-20 ENCOUNTER — OFFICE VISIT (OUTPATIENT)
Dept: FAMILY MEDICINE CLINIC | Facility: CLINIC | Age: 45
End: 2025-05-20
Payer: COMMERCIAL

## 2025-05-20 VITALS
SYSTOLIC BLOOD PRESSURE: 120 MMHG | TEMPERATURE: 96.7 F | WEIGHT: 142 LBS | BODY MASS INDEX: 21.52 KG/M2 | HEART RATE: 68 BPM | HEIGHT: 68 IN | DIASTOLIC BLOOD PRESSURE: 68 MMHG | OXYGEN SATURATION: 98 %

## 2025-05-20 DIAGNOSIS — F32.2 SEVERE MAJOR DEPRESSIVE DISORDER (HCC): ICD-10-CM

## 2025-05-20 DIAGNOSIS — F32.A DEPRESSION, UNSPECIFIED DEPRESSION TYPE: ICD-10-CM

## 2025-05-20 DIAGNOSIS — F41.1 GENERALIZED ANXIETY DISORDER: ICD-10-CM

## 2025-05-20 DIAGNOSIS — Z00.00 WELL ADULT EXAM: Primary | ICD-10-CM

## 2025-05-20 PROBLEM — E87.1 HYPONATREMIA: Status: RESOLVED | Noted: 2024-02-03 | Resolved: 2025-05-20

## 2025-05-20 PROCEDURE — 99396 PREV VISIT EST AGE 40-64: CPT | Performed by: FAMILY MEDICINE

## 2025-05-20 RX ORDER — VENLAFAXINE HYDROCHLORIDE 75 MG/1
75 CAPSULE, EXTENDED RELEASE ORAL DAILY
Qty: 90 CAPSULE | Refills: 1 | Status: SHIPPED | OUTPATIENT
Start: 2025-05-20

## 2025-05-20 NOTE — PATIENT INSTRUCTIONS
"Labwork after 10/24- quest  Patient Education     Routine physical for adults   The Basics   Written by the doctors and editors at [facility]   What is a physical? -- A physical is a routine visit, or \"check-up,\" with your doctor. You might also hear it called a \"wellness visit\" or \"preventive visit.\"  During each visit, the doctor will:   Ask about your physical and mental health   Ask about your habits, behaviors, and lifestyle   Do an exam   Give you vaccines if needed   Talk to you about any medicines you take   Give advice about your health   Answer your questions  Getting regular check-ups is an important part of taking care of your health. It can help your doctor find and treat any problems you have. But it's also important for preventing health problems.  A routine physical is different from a \"sick visit.\" A sick visit is when you see a doctor because of a health concern or problem. Since physicals are scheduled ahead of time, you can think about what you want to ask the doctor.  How often should I get a physical? -- It depends on your age and health. In general, for people age 21 years and older:   If you are younger than 50 years, you might be able to get a physical every 3 years.   If you are 50 years or older, your doctor might recommend a physical every year.  If you have an ongoing health condition, like diabetes or high blood pressure, your doctor will probably want to see you more often.  What happens during a physical? -- In general, each visit will include:   Physical exam - The doctor or nurse will check your height, weight, heart rate, and blood pressure. They will also look at your eyes and ears. They will ask about how you are feeling and whether you have any symptoms that bother you.   Medicines - It's a good idea to bring a list of all the medicines you take to each doctor visit. Your doctor will talk to you about your medicines and answer any questions. Tell them if you are having any side " "effects that bother you. You should also tell them if you are having trouble paying for any of your medicines.   Habits and behaviors - This includes:   Your diet   Your exercise habits   Whether you smoke, drink alcohol, or use drugs   Whether you are sexually active   Whether you feel safe at home  Your doctor will talk to you about things you can do to improve your health and lower your risk of health problems. They will also offer help and support. For example, if you want to quit smoking, they can give you advice and might prescribe medicines. If you want to improve your diet or get more physical activity, they can help you with this, too.   Lab tests, if needed - The tests you get will depend on your age and situation. For example, your doctor might want to check your:   Cholesterol   Blood sugar   Iron level   Vaccines - The recommended vaccines will depend on your age, health, and what vaccines you already had. Vaccines are very important because they can prevent certain serious or deadly infections.   Discussion of screening - \"Screening\" means checking for diseases or other health problems before they cause symptoms. Your doctor can recommend screening based on your age, risk, and preferences. This might include tests to check for:   Cancer, such as breast, prostate, cervical, ovarian, colorectal, prostate, lung, or skin cancer   Sexually transmitted infections, such as chlamydia and gonorrhea   Mental health conditions like depression and anxiety  Your doctor will talk to you about the different types of screening tests. They can help you decide which screenings to have. They can also explain what the results might mean.   Answering questions - The physical is a good time to ask the doctor or nurse questions about your health. If needed, they can refer you to other doctors or specialists, too.  Adults older than 65 years often need other care, too. As you get older, your doctor will talk to you about:   " How to prevent falling at home   Hearing or vision tests   Memory testing   How to take your medicines safely   Making sure that you have the help and support you need at home  All topics are updated as new evidence becomes available and our peer review process is complete.  This topic retrieved from [employer] on: May 02, 2024.  Topic 377999 Version 1.0  Release: 32.4.3 - C32.122  © 2024 UpToDate, Inc. and/or its affiliates. All rights reserved.  Consumer Information Use and Disclaimer   Disclaimer: This generalized information is a limited summary of diagnosis, treatment, and/or medication information. It is not meant to be comprehensive and should be used as a tool to help the user understand and/or assess potential diagnostic and treatment options. It does NOT include all information about conditions, treatments, medications, side effects, or risks that may apply to a specific patient. It is not intended to be medical advice or a substitute for the medical advice, diagnosis, or treatment of a health care provider based on the health care provider's examination and assessment of a patient's specific and unique circumstances. Patients must speak with a health care provider for complete information about their health, medical questions, and treatment options, including any risks or benefits regarding use of medications. This information does not endorse any treatments or medications as safe, effective, or approved for treating a specific patient. UpToDate, Inc. and its affiliates disclaim any warranty or liability relating to this information or the use thereof.The use of this information is governed by the Terms of Use, available at https://www.wolterskluwer.com/en/know/clinical-effectiveness-terms. 2024© UpToDate, Inc. and its affiliates and/or licensors. All rights reserved.  Copyright   © 2024 UpToDate, Inc. and/or its affiliates. All rights reserved.

## 2025-05-20 NOTE — PROGRESS NOTES
ADULT ANNUAL PHYSICAL  [facility] PRACT[facility]    NAME: Eliana Hawk  AGE: 45 y.o. SEX: female  : 1980     DATE: 2025     Assessment and Plan:     1. Well adult exam  2. Generalized anxiety disorder  Assessment & Plan:  Venlafaxine helping with social anxiety - better with higher dose, 75mg daily   3. Depression, unspecified depression type  -     venlafaxine (EFFEXOR-XR) 75 mg 24 hr capsule; Take 1 capsule (75 mg total) by mouth daily  4. Severe major depressive disorder (HCC)  Assessment & Plan:  Depression Screening Follow-up Plan: Patient's depression screening was positive with a PHQ-9 score of 7. Patient assessed for underlying major depression. They have no active suicidal ideations. Brief counseling provided and recommend additional follow-up/re-evaluation next office visit. Continue regular follow-up with their psychologist/therapist/psychiatrist who is managing their mental health condition(s). Patient's depressive symptoms likely due to other medical condition. Would recommend treatment of underlying condition. Will continue to monitor at next office visit. Pt doing well on venlafaxine 75mg daily       Immunizations and preventive care screenings were discussed with patient today. Appropriate education was printed on patient's after visit summary.    Counseling:  Dental Health: discussed importance of regular tooth brushing, flossing, and dental visits.  Exercise: the importance of regular exercise/physical activity was discussed. Recommend exercise 3-5 times per week for at least 30 minutes.       Depression Screening and Follow-up Plan: Patient's depression screening was positive with a PHQ-9 score of 7.   Patient assessed for underlying major depression. Brief counseling provided and recommend additional follow-up/re-evaluation next office visit. Depression likely due to other medical condition. Will treat underlying condition.         No  follow-ups on file.     Chief Complaint:     Chief Complaint   Patient presents with    Physical Exam     Patient is here for annual physical and medication check. Patient is in perimenopause and would like to discuss.  Patient has a spot on her arm she would like checked.     Medication Management      History of Present Illness:     Pt is here for a physical today. Taking venlafaxine 75mg daily and doing well- helps with social anxiety   EMDR therapy- has been in therapy for 20 years.  Pt takes valtrex for supressive therapy.           Review of Systems:     Review of Systems   Constitutional: Negative.  Negative for fatigue and fever.   HENT: Negative.     Eyes: Negative.    Respiratory: Negative.  Negative for cough.    Cardiovascular: Negative.    Gastrointestinal: Negative.    Endocrine: Negative.    Genitourinary: Negative.    Musculoskeletal: Negative.    Skin: Negative.    Allergic/Immunologic: Negative.    Neurological: Negative.    Psychiatric/Behavioral: Negative.        Past Medical History:     Past Medical History:   Diagnosis Date    Anxiety     COVID-19 virus infection 4/5/2021    Depression     Disease of thyroid gland     Hashimoto's    Gall stones     Gestational diabetes mellitus, class A1     Last Assessed: 04 Feb 2016    H/O diagnostic mammography 02/26/2021    Bi-Rads 3. Probable asymmetric breast tissue in the outer left breast. Left diagnostic mammois recommended in 6 months to demonstrate stability. Left breast US 2/26/2021-Probable asymmetric breast tissue in the outer left breast. Left diagnostic mammo is recommended in 6 months to demonstrat stability    Hashimoto's disease     Herpes March 2023    History of HPV infection     History of posttraumatic stress disorder (PTSD)     History of screening mammography 02/17/2021    Bi-Rads 0    Migraine     Last Assessed: 22 Jan 2014    Miscarriage     Umbilical hernia       Past Surgical History:     Past Surgical History:   Procedure  Laterality Date    CERVICAL CONIZATION   W/ LASER      CHOLECYSTECTOMY      EGD AND COLONOSCOPY N/A 2018    Procedure: EGD AND COLONOSCOPY;  Surgeon: Stephen Kamara MD;  Location: [facility];  Service: Gastroenterology    GENPATH CONE BIOPSY/LEEP (HISTORICAL)      HERNIA REPAIR      MAMMO (HISTORICAL)      [facility]    MAMMO (HISTORICAL)      [facility]    MULTIPLE TOOTH EXTRACTIONS      OK CONIZATION CERVIX W/WO D&C RPR ELTRD EXC N/A 3/25/2024    Procedure: BIOPSY LEEP CERVIX, EUA;  Surgeon: Saad Jamison MD;  Location: [facility];  Service: Gynecology    OK LAPAROSCOPY SURG CHOLECYSTECTOMY N/A 2017    Procedure: CHOLECYSTECTOMY LAPAROSCOPIC;  Surgeon: Anibal Ellison MD;  Location: [facility];  Service: General    OK LAPS SURG CHOLECYSTECTOMY W/CHOLANGIOGRAPHY N/A 2017    Procedure: CHOLANGIOGRAM;  Surgeon: Anibal Ellison MD;  Location: [facility];  Service: General    OK RPR UMBILICAL HRNA 5 YRS/> REDUCIBLE N/A 2017    Procedure: OPEN UMBILICAL HERNIA REPAIR ;  Surgeon: Anibal Ellison MD;  Location: [facility];  Service: General    TUBAL LIGATION      VAGINAL DELIVERY      X 2      Social History:     Social History     Socioeconomic History    Marital status:      Spouse name: None    Number of children: None    Years of education: None    Highest education level: None   Occupational History    None   Tobacco Use    Smoking status: Former     Types: Cigarettes     Start date:      Quit date:      Years since quittin.4     Passive exposure: Never    Smokeless tobacco: Never   Vaping Use    Vaping status: Never Used   Substance and Sexual Activity    Alcohol use: Yes     Alcohol/week: 10.0 standard drinks of alcohol     Types: 10 Standard drinks or equivalent per week     Comment: Per [employer]: socially    Drug use: Yes     Types: Marijuana     Comment: medical     Sexual activity: Yes     Partners: Male     Birth control/protection: Female Sterilization,  "Post-menopausal     Comment: multiple partners in past year   Other Topics Concern    None   Social History Narrative    Exercise: Occassionally    Domestic violence: No    Occupation     Alchohol use socially on weekends (1-4/week)    Current illegal drug use Yes     Social Drivers of Health     Financial Resource Strain: Not on file   Food Insecurity: Not on file   Transportation Needs: Not on file   Physical Activity: Not on file   Stress: Not on file   Social Connections: Not on file   Intimate Partner Violence: Not on file   Housing Stability: Not on file      Family History:     Family History   Problem Relation Age of Onset    Thyroid disease Mother     Heart murmur Father     No Known Problems Sister     No Known Problems Daughter     No Known Problems Maternal Grandmother     Stroke Maternal Grandfather     Breast cancer Paternal Grandmother 65    Skin cancer Paternal Grandmother     Colon cancer Paternal Grandfather 55    No Known Problems Maternal Aunt     No Known Problems Maternal Aunt     Uterine cancer Neg Hx     Ovarian cancer Neg Hx       Current Medications:     Current Medications[1]   Allergies:     Allergies[2]   Physical Exam:     /68 (BP Location: Left arm, Patient Position: Sitting, Cuff Size: Adult)   Pulse 68   Temp (!) 96.7 °F (35.9 °C) (Tympanic)   Ht 5' 8\" (1.727 m)   Wt 64.4 kg (142 lb)   SpO2 98%   BMI 21.59 kg/m²     Physical Exam  Vitals and nursing note reviewed.   Constitutional:       Appearance: She is well-developed.   HENT:      Head: Normocephalic and atraumatic.      Right Ear: External ear normal.      Left Ear: External ear normal.      Nose: Nose normal.     Eyes:      Conjunctiva/sclera: Conjunctivae normal.      Pupils: Pupils are equal, round, and reactive to light.       Cardiovascular:      Rate and Rhythm: Normal rate and regular rhythm.      Heart sounds: Normal heart sounds.   Pulmonary:      Effort: Pulmonary effort is normal.      Breath " sounds: Normal breath sounds.   Abdominal:      General: Bowel sounds are normal.      Palpations: Abdomen is soft.     Musculoskeletal:         General: Normal range of motion.      Cervical back: Normal range of motion and neck supple.     Skin:     General: Skin is warm and dry.     Neurological:      Mental Status: She is alert and oriented to person, place, and time.     Psychiatric:         Behavior: Behavior normal.         Thought Content: Thought content normal.         Judgment: Judgment normal.          Nicolle Saldana [facility]         [1]   Current Outpatient Medications   Medication Sig Dispense Refill    multivitamin (THERAGRAN) TABS Take 1 tablet by mouth in the morning.      valACYclovir (VALTREX) 500 mg tablet Take 2 tablets (1,000 mg total) by mouth daily 180 tablet 4    venlafaxine (EFFEXOR-XR) 75 mg 24 hr capsule Take 1 capsule (75 mg total) by mouth daily 90 capsule 1    vitamin B-12 (CYANOCOBALAMIN) 50 MCG TABS Take 50 mcg by mouth in the morning.      Zinc 50 MG TABS Take by mouth      acyclovir (ZOVIRAX) 5 % ointment Apply topically every 4 (four) hours for 5 days 30 g 0     No current facility-administered medications for this visit.   [2]   Allergies  Allergen Reactions    Septra [Sulfamethoxazole-Trimethoprim] Rash    Sulfa Antibiotics Rash     Category: Allergy;

## 2025-05-20 NOTE — ASSESSMENT & PLAN NOTE
Depression Screening Follow-up Plan: Patient's depression screening was positive with a PHQ-9 score of 7. Patient assessed for underlying major depression. They have no active suicidal ideations. Brief counseling provided and recommend additional follow-up/re-evaluation next office visit. Continue regular follow-up with their psychologist/therapist/psychiatrist who is managing their mental health condition(s). Patient's depressive symptoms likely due to other medical condition. Would recommend treatment of underlying condition. Will continue to monitor at next office visit. Pt doing well on venlafaxine 75mg daily

## (undated) DEVICE — GLOVE SRG LF STRL BGL SKNSNS 8 PF

## (undated) DEVICE — NEPTUNE E-SEP SMOKE EVACUATION PENCIL, COATED, 70MM BLADE, PUSH BUTTON SWITCH: Brand: NEPTUNE E-SEP

## (undated) DEVICE — GLOVE SRG BIOGEL 7

## (undated) DEVICE — PREMIUM DRY TRAY LF: Brand: MEDLINE INDUSTRIES, INC.

## (undated) DEVICE — 2000CC GUARDIAN II: Brand: GUARDIAN

## (undated) DEVICE — SUT VICRYL 0 UR-6 27 IN J603H

## (undated) DEVICE — SYRINGE 10ML LL CONTROL TOP

## (undated) DEVICE — ENDOPATH XCEL BLADELESS TROCARS WITH STABILITY SLEEVES: Brand: ENDOPATH XCEL

## (undated) DEVICE — NEEDLE SPINAL 22G X 3.5IN  QUINCKE

## (undated) DEVICE — ALLENTOWN LAP CHOLE APP PACK: Brand: CARDINAL HEALTH

## (undated) DEVICE — STRL ALLENTOWN HYSTEROSCOPY PK: Brand: CARDINAL HEALTH

## (undated) DEVICE — GLOVE INDICATOR PI UNDERGLOVE SZ 8 BLUE

## (undated) DEVICE — GLOVE SRG BIOGEL ECLIPSE 8

## (undated) DEVICE — ENDOPOUCH RETRIEVER SPECIMEN RETRIEVAL BAGS: Brand: ENDOPOUCH RETRIEVER

## (undated) DEVICE — LIGAMAX 5 MM ENDOSCOPIC MULTIPLE CLIP APPLIER: Brand: LIGAMAX

## (undated) DEVICE — PAD GROUNDING DUAL ADULT

## (undated) DEVICE — BLUE HEAT SCOPE WARMER

## (undated) DEVICE — STANDARD SURGICAL GOWN, L: Brand: CONVERTORS

## (undated) DEVICE — CONMED ACCESSORY ELECTRODE, FLAT BLADE WITH EXTENDED INSULATION: Brand: CONMED

## (undated) DEVICE — MEDI-VAC YANKAUER SUCTION HANDLE W/STRAIGHT TIP & CONTROL VENT: Brand: CARDINAL HEALTH

## (undated) DEVICE — ENDOPATH XCEL BLUNT TIP TROCARS WITH SMOOTH SLEEVES: Brand: ENDOPATH XCEL

## (undated) DEVICE — AEM CORD

## (undated) DEVICE — SUT MONOCRYL 4-0 PS-2 27 IN Y426H

## (undated) DEVICE — SUT PROLENE 2-0 CT-2 30 IN 8411H

## (undated) DEVICE — MAYO STAND COVER: Brand: CONVERTORS

## (undated) DEVICE — POV-IOD SOLUTION 4OZ BT

## (undated) DEVICE — TUBING SUCTION 5MM X 12 FT

## (undated) DEVICE — LLETZ LOOP 20 X 12MM MEGADYNE

## (undated) DEVICE — CHLORAPREP HI-LITE 26ML ORANGE

## (undated) DEVICE — STERILE 8 INCH PROCTO SWAB: Brand: CARDINAL HEALTH

## (undated) DEVICE — SPECIMEN CONTAINER STERILE PEEL PACK

## (undated) DEVICE — IV CATH 14 G SAFETY

## (undated) DEVICE — SUT VICRYL 3-0 SH 27 IN J416H

## (undated) DEVICE — DRAPE C-ARM X-RAY

## (undated) DEVICE — IRRIG ENDO FLO TUBING

## (undated) DEVICE — TAUT CATH INTRODUCER 4.5 FR

## (undated) DEVICE — ARTHROSCOPY FLOOR MAT

## (undated) DEVICE — MAXI PAD5.51 X 13.78 IN. (14.0 X 35.0 CM)HEAVYCONTOUREDUNSCENTED: Brand: CURITY

## (undated) DEVICE — STOPCOCK 4-WAY

## (undated) DEVICE — ENDOPATH XCEL UNIVERSAL TROCAR STABLILITY SLEEVES: Brand: ENDOPATH XCEL

## (undated) DEVICE — INTENDED FOR TISSUE SEPARATION, AND OTHER PROCEDURES THAT REQUIRE A SHARP SURGICAL BLADE TO PUNCTURE OR CUT.: Brand: BARD-PARKER SAFETY BLADES SIZE 11, STERILE

## (undated) DEVICE — REM POLYHESIVE ADULT PATIENT RETURN ELECTRODE: Brand: VALLEYLAB

## (undated) DEVICE — GLOVE SRG BIOGEL 6.5

## (undated) DEVICE — SYRINGE 30ML LL

## (undated) DEVICE — GLOVE INDICATOR PI UNDERGLOVE SZ 7 BLUE

## (undated) DEVICE — LAP AEM SCISSOR TIP .75 IN

## (undated) DEVICE — VIAL DECANTER

## (undated) DEVICE — SCD SEQUENTIAL COMPRESSION COMFORT SLEEVE MEDIUM KNEE LENGTH: Brand: KENDALL SCD

## (undated) DEVICE — NEEDLE 25G X 1 1/2

## (undated) DEVICE — ADHESIVE SKN CLSR HISTOACRYL FLEX 0.5ML LF